# Patient Record
Sex: FEMALE | NOT HISPANIC OR LATINO
[De-identification: names, ages, dates, MRNs, and addresses within clinical notes are randomized per-mention and may not be internally consistent; named-entity substitution may affect disease eponyms.]

---

## 2018-01-04 PROBLEM — Z00.00 ENCOUNTER FOR PREVENTIVE HEALTH EXAMINATION: Status: ACTIVE | Noted: 2018-01-04

## 2018-01-08 ENCOUNTER — RECORD ABSTRACTING (OUTPATIENT)
Age: 43
End: 2018-01-08

## 2018-01-08 DIAGNOSIS — Z78.9 OTHER SPECIFIED HEALTH STATUS: ICD-10-CM

## 2018-01-08 DIAGNOSIS — Z83.3 FAMILY HISTORY OF DIABETES MELLITUS: ICD-10-CM

## 2018-01-08 DIAGNOSIS — Z82.5 FAMILY HISTORY OF ASTHMA AND OTHER CHRONIC LOWER RESPIRATORY DISEASES: ICD-10-CM

## 2018-01-08 DIAGNOSIS — Z82.49 FAMILY HISTORY OF ISCHEMIC HEART DISEASE AND OTHER DISEASES OF THE CIRCULATORY SYSTEM: ICD-10-CM

## 2018-01-08 DIAGNOSIS — D68.59 OTHER PRIMARY THROMBOPHILIA: ICD-10-CM

## 2018-01-08 RX ORDER — ENOXAPARIN SODIUM 40 MG/.4ML
40 INJECTION SUBCUTANEOUS
Refills: 0 | Status: ACTIVE | COMMUNITY

## 2018-01-08 RX ORDER — DIAZEPAM 5 MG/1
5 TABLET ORAL
Refills: 0 | Status: ACTIVE | COMMUNITY

## 2018-01-08 RX ORDER — METHYLPREDNISOLONE 4 MG/1
4 TABLET ORAL
Refills: 0 | Status: ACTIVE | COMMUNITY

## 2018-01-08 RX ORDER — CHORIONIC GONADOTROPIN 10000 UNIT
KIT INTRAMUSCULAR
Refills: 0 | Status: ACTIVE | COMMUNITY

## 2018-01-08 RX ORDER — NITROFURANTOIN (MONOHYDRATE/MACROCRYSTALS) 25; 75 MG/1; MG/1
100 CAPSULE ORAL
Refills: 0 | Status: ACTIVE | COMMUNITY

## 2018-01-08 RX ORDER — METFORMIN ER 750 MG 750 MG/1
750 TABLET ORAL
Refills: 0 | Status: ACTIVE | COMMUNITY

## 2018-01-08 RX ORDER — PROGESTERONE 50 MG/ML
50 INJECTION, SOLUTION INTRAMUSCULAR
Refills: 0 | Status: ACTIVE | COMMUNITY

## 2018-01-23 ENCOUNTER — APPOINTMENT (OUTPATIENT)
Dept: OBGYN | Facility: CLINIC | Age: 43
End: 2018-01-23
Payer: COMMERCIAL

## 2018-01-23 VITALS — HEIGHT: 63 IN | BODY MASS INDEX: 51.91 KG/M2 | WEIGHT: 293 LBS

## 2018-01-23 LAB
BILIRUB UR QL STRIP: NORMAL
CLARITY UR: CLEAR
GLUCOSE UR-MCNC: NORMAL
HCG UR QL: NORMAL EU/DL
HGB UR QL STRIP.AUTO: NORMAL
KETONES UR-MCNC: NORMAL
LEUKOCYTE ESTERASE UR QL STRIP: NORMAL
NITRITE UR QL STRIP: NORMAL
PH UR STRIP: NORMAL
PROT UR STRIP-MCNC: NORMAL
SP GR UR STRIP: NORMAL

## 2018-01-23 PROCEDURE — 0502F SUBSEQUENT PRENATAL CARE: CPT

## 2018-01-29 ENCOUNTER — RESULT REVIEW (OUTPATIENT)
Age: 43
End: 2018-01-29

## 2018-01-30 ENCOUNTER — APPOINTMENT (OUTPATIENT)
Dept: OBGYN | Facility: CLINIC | Age: 43
End: 2018-01-30
Payer: COMMERCIAL

## 2018-01-30 ENCOUNTER — OUTPATIENT (OUTPATIENT)
Dept: OUTPATIENT SERVICES | Facility: HOSPITAL | Age: 43
LOS: 1 days | Discharge: HOME | End: 2018-01-30

## 2018-01-30 VITALS — BODY MASS INDEX: 51.91 KG/M2 | WEIGHT: 293 LBS | HEIGHT: 63 IN

## 2018-01-30 DIAGNOSIS — Z34.90 ENCOUNTER FOR SUPERVISION OF NORMAL PREGNANCY, UNSPECIFIED, UNSPECIFIED TRIMESTER: ICD-10-CM

## 2018-01-30 PROCEDURE — 0502F SUBSEQUENT PRENATAL CARE: CPT

## 2018-02-01 ENCOUNTER — APPOINTMENT (OUTPATIENT)
Dept: OBGYN | Facility: CLINIC | Age: 43
End: 2018-02-01

## 2018-02-04 DIAGNOSIS — O41.00X0 OLIGOHYDRAMNIOS, UNSPECIFIED TRIMESTER, NOT APPLICABLE OR UNSPECIFIED: ICD-10-CM

## 2018-02-08 ENCOUNTER — APPOINTMENT (OUTPATIENT)
Dept: OBGYN | Facility: CLINIC | Age: 43
End: 2018-02-08
Payer: COMMERCIAL

## 2018-02-08 ENCOUNTER — OUTPATIENT (OUTPATIENT)
Dept: OUTPATIENT SERVICES | Facility: HOSPITAL | Age: 43
LOS: 1 days | Discharge: HOME | End: 2018-02-08

## 2018-02-08 VITALS — WEIGHT: 293 LBS | BODY MASS INDEX: 54.56 KG/M2

## 2018-02-08 DIAGNOSIS — Z34.90 ENCOUNTER FOR SUPERVISION OF NORMAL PREGNANCY, UNSPECIFIED, UNSPECIFIED TRIMESTER: ICD-10-CM

## 2018-02-08 DIAGNOSIS — Z98.890 OTHER SPECIFIED POSTPROCEDURAL STATES: ICD-10-CM

## 2018-02-08 DIAGNOSIS — Z34.00 ENCOUNTER FOR SUPERVISION OF NORMAL FIRST PREGNANCY, UNSPECIFIED TRIMESTER: ICD-10-CM

## 2018-02-08 LAB
GLUCOSE UR-MCNC: NORMAL
HGB UR QL STRIP.AUTO: NORMAL
KETONES UR-MCNC: NORMAL
LEUKOCYTE ESTERASE UR QL STRIP: NORMAL
NITRITE UR QL STRIP: NORMAL
PROT UR STRIP-MCNC: NORMAL

## 2018-02-08 PROCEDURE — 0502F SUBSEQUENT PRENATAL CARE: CPT

## 2018-02-14 ENCOUNTER — INPATIENT (INPATIENT)
Facility: HOSPITAL | Age: 43
LOS: 5 days | Discharge: HOME | End: 2018-02-20
Attending: OBSTETRICS & GYNECOLOGY | Admitting: OBSTETRICS & GYNECOLOGY

## 2018-02-14 ENCOUNTER — APPOINTMENT (OUTPATIENT)
Dept: OBGYN | Facility: CLINIC | Age: 43
End: 2018-02-14
Payer: COMMERCIAL

## 2018-02-14 ENCOUNTER — NON-APPOINTMENT (OUTPATIENT)
Age: 43
End: 2018-02-14

## 2018-02-14 VITALS — WEIGHT: 293 LBS | BODY MASS INDEX: 54.56 KG/M2

## 2018-02-14 VITALS — TEMPERATURE: 98 F

## 2018-02-14 DIAGNOSIS — Z3A.37 37 WEEKS GESTATION OF PREGNANCY: ICD-10-CM

## 2018-02-14 DIAGNOSIS — O24.419 GESTATIONAL DIABETES MELLITUS IN PREGNANCY, UNSPECIFIED CONTROL: ICD-10-CM

## 2018-02-14 DIAGNOSIS — Z98.890 OTHER SPECIFIED POSTPROCEDURAL STATES: Chronic | ICD-10-CM

## 2018-02-14 DIAGNOSIS — O14.93 UNSPECIFIED PRE-ECLAMPSIA, THIRD TRIMESTER: ICD-10-CM

## 2018-02-14 DIAGNOSIS — O30.009 TWIN PREGNANCY, UNSPECIFIED NUMBER OF PLACENTA AND UNSPECIFIED NUMBER OF AMNIOTIC SACS, UNSPECIFIED TRIMESTER: ICD-10-CM

## 2018-02-14 LAB
ALBUMIN SERPL ELPH-MCNC: 1.8 G/DL — LOW (ref 3–5.5)
ALP SERPL-CCNC: 277 U/L — HIGH (ref 30–115)
ALT FLD-CCNC: 57 U/L — HIGH (ref 0–41)
ANION GAP SERPL CALC-SCNC: 12 MMOL/L — SIGNIFICANT CHANGE UP (ref 7–14)
APPEARANCE UR: (no result)
APTT BLD: 32.2 SEC — SIGNIFICANT CHANGE UP (ref 27–39.2)
AST SERPL-CCNC: 74 U/L — HIGH (ref 0–41)
BACTERIA # UR AUTO: (no result) /HPF
BASOPHILS # BLD AUTO: 0.05 K/UL — SIGNIFICANT CHANGE UP (ref 0–0.2)
BASOPHILS NFR BLD AUTO: 0.5 % — SIGNIFICANT CHANGE UP (ref 0–1)
BILIRUB SERPL-MCNC: 0.7 MG/DL — SIGNIFICANT CHANGE UP (ref 0.2–1.2)
BILIRUB UR-MCNC: (no result)
BLD GP AB SCN SERPL QL: SIGNIFICANT CHANGE UP
BUN SERPL-MCNC: 13 MG/DL — SIGNIFICANT CHANGE UP (ref 10–20)
CALCIUM SERPL-MCNC: 7.9 MG/DL — LOW (ref 8.5–10.1)
CHLORIDE SERPL-SCNC: 106 MMOL/L — SIGNIFICANT CHANGE UP (ref 98–110)
CO2 SERPL-SCNC: 16 MMOL/L — LOW (ref 17–32)
COLOR SPEC: (no result)
CREAT SERPL-MCNC: 0.8 MG/DL — SIGNIFICANT CHANGE UP (ref 0.7–1.5)
DIFF PNL FLD: (no result)
EOSINOPHIL # BLD AUTO: 0.09 K/UL — SIGNIFICANT CHANGE UP (ref 0–0.7)
EOSINOPHIL NFR BLD AUTO: 0.9 % — SIGNIFICANT CHANGE UP (ref 0–8)
EPI CELLS # UR: (no result) /HPF
FIBRINOGEN PPP-MCNC: >700 MG/DL — HIGH (ref 204.4–570.6)
GLUCOSE SERPL-MCNC: 65 MG/DL — LOW (ref 70–110)
GLUCOSE UR QL: NEGATIVE — SIGNIFICANT CHANGE UP
GLUCOSE UR-MCNC: NORMAL
HCT VFR BLD CALC: 32.7 % — LOW (ref 37–47)
HGB BLD-MCNC: 11.1 G/DL — LOW (ref 14–18)
HGB UR QL STRIP.AUTO: NORMAL
HIV 1 & 2 AB SERPL IA.RAPID: SIGNIFICANT CHANGE UP
IMM GRANULOCYTES NFR BLD AUTO: 0.8 % — HIGH (ref 0.1–0.3)
INR BLD: 0.92 RATIO — SIGNIFICANT CHANGE UP (ref 0.65–1.3)
KETONES UR-MCNC: NEGATIVE — SIGNIFICANT CHANGE UP
KETONES UR-MCNC: NORMAL
LDH SERPL L TO P-CCNC: 311 U/L — HIGH (ref 60–200)
LEUKOCYTE ESTERASE UR QL STRIP: NORMAL
LEUKOCYTE ESTERASE UR-ACNC: NEGATIVE — SIGNIFICANT CHANGE UP
LYMPHOCYTES # BLD AUTO: 1.32 K/UL — SIGNIFICANT CHANGE UP (ref 1.2–3.4)
LYMPHOCYTES # BLD AUTO: 13 % — LOW (ref 20.5–51.1)
MCHC RBC-ENTMCNC: 29.2 PG — SIGNIFICANT CHANGE UP (ref 27–31)
MCHC RBC-ENTMCNC: 33.9 G/DL — SIGNIFICANT CHANGE UP (ref 32–37)
MCV RBC AUTO: 86.1 FL — SIGNIFICANT CHANGE UP (ref 81–91)
MONOCYTES # BLD AUTO: 0.72 K/UL — HIGH (ref 0.1–0.6)
MONOCYTES NFR BLD AUTO: 7.1 % — SIGNIFICANT CHANGE UP (ref 1.7–9.3)
NEUTROPHILS # BLD AUTO: 7.88 K/UL — HIGH (ref 1.4–6.5)
NEUTROPHILS NFR BLD AUTO: 77.7 % — HIGH (ref 42.2–75.2)
NITRITE UR QL STRIP: NORMAL
NITRITE UR-MCNC: NEGATIVE — SIGNIFICANT CHANGE UP
NRBC # BLD: 0 /100 WBCS — SIGNIFICANT CHANGE UP (ref 0–0)
PH UR: 6 — SIGNIFICANT CHANGE UP (ref 5–8)
PLATELET # BLD AUTO: 273 K/UL — SIGNIFICANT CHANGE UP (ref 130–400)
POTASSIUM SERPL-MCNC: 5 MMOL/L — SIGNIFICANT CHANGE UP (ref 3.5–5)
POTASSIUM SERPL-SCNC: 5 MMOL/L — SIGNIFICANT CHANGE UP (ref 3.5–5)
PROT SERPL-MCNC: 4.7 G/DL — LOW (ref 6–8)
PROT UR STRIP-MCNC: NORMAL
PROT UR-MCNC: >=300
PROTHROM AB SERPL-ACNC: 9.9 SEC — LOW (ref 9.95–12.87)
RBC # BLD: 3.8 M/UL — LOW (ref 4.2–5.4)
RBC # FLD: 18.7 % — HIGH (ref 11.5–14.5)
RBC CASTS # UR COMP ASSIST: (no result) /HPF
SODIUM SERPL-SCNC: 134 MMOL/L — LOW (ref 135–146)
SP GR SPEC: >=1.03 — SIGNIFICANT CHANGE UP (ref 1.01–1.03)
TYPE + AB SCN PNL BLD: SIGNIFICANT CHANGE UP
URATE SERPL-MCNC: 7.2 MG/DL — SIGNIFICANT CHANGE UP (ref 2.6–8)
UROBILINOGEN FLD QL: 0.2 — SIGNIFICANT CHANGE UP (ref 0.2–0.2)
WBC # BLD: 10.14 K/UL — SIGNIFICANT CHANGE UP (ref 4.8–10.8)
WBC # FLD AUTO: 10.14 K/UL — SIGNIFICANT CHANGE UP (ref 4.8–10.8)
WBC UR QL: SIGNIFICANT CHANGE UP /HPF

## 2018-02-14 PROCEDURE — 0502F SUBSEQUENT PRENATAL CARE: CPT

## 2018-02-14 RX ORDER — SODIUM CHLORIDE 9 MG/ML
1000 INJECTION, SOLUTION INTRAVENOUS
Qty: 0 | Refills: 0 | Status: DISCONTINUED | OUTPATIENT
Start: 2018-02-14 | End: 2018-02-15

## 2018-02-14 RX ORDER — TETANUS TOXOID, REDUCED DIPHTHERIA TOXOID AND ACELLULAR PERTUSSIS VACCINE, ADSORBED 5; 2.5; 8; 8; 2.5 [IU]/.5ML; [IU]/.5ML; UG/.5ML; UG/.5ML; UG/.5ML
0.5 SUSPENSION INTRAMUSCULAR ONCE
Qty: 0 | Refills: 0 | Status: DISCONTINUED | OUTPATIENT
Start: 2018-02-14 | End: 2018-02-15

## 2018-02-14 RX ORDER — OXYCODONE AND ACETAMINOPHEN 5; 325 MG/1; MG/1
2 TABLET ORAL EVERY 6 HOURS
Qty: 0 | Refills: 0 | Status: DISCONTINUED | OUTPATIENT
Start: 2018-02-14 | End: 2018-02-20

## 2018-02-14 RX ORDER — LANOLIN
1 OINTMENT (GRAM) TOPICAL
Qty: 0 | Refills: 0 | Status: DISCONTINUED | OUTPATIENT
Start: 2018-02-14 | End: 2018-02-20

## 2018-02-14 RX ORDER — GLYCERIN ADULT
1 SUPPOSITORY, RECTAL RECTAL AT BEDTIME
Qty: 0 | Refills: 0 | Status: DISCONTINUED | OUTPATIENT
Start: 2018-02-14 | End: 2018-02-20

## 2018-02-14 RX ORDER — OXYTOCIN 10 UNIT/ML
41.67 VIAL (ML) INJECTION
Qty: 20 | Refills: 0 | Status: DISCONTINUED | OUTPATIENT
Start: 2018-02-14 | End: 2018-02-20

## 2018-02-14 RX ORDER — SODIUM CHLORIDE 9 MG/ML
300 INJECTION, SOLUTION INTRAVENOUS ONCE
Qty: 0 | Refills: 0 | Status: DISCONTINUED | OUTPATIENT
Start: 2018-02-14 | End: 2018-02-15

## 2018-02-14 RX ORDER — MAGNESIUM SULFATE 500 MG/ML
4 VIAL (ML) INJECTION ONCE
Qty: 0 | Refills: 0 | Status: COMPLETED | OUTPATIENT
Start: 2018-02-14 | End: 2018-02-14

## 2018-02-14 RX ORDER — CEFAZOLIN SODIUM 1 G
2000 VIAL (EA) INJECTION EVERY 8 HOURS
Qty: 0 | Refills: 0 | Status: COMPLETED | OUTPATIENT
Start: 2018-02-14 | End: 2018-02-16

## 2018-02-14 RX ORDER — LABETALOL HCL 100 MG
40 TABLET ORAL ONCE
Qty: 0 | Refills: 0 | Status: COMPLETED | OUTPATIENT
Start: 2018-02-14 | End: 2018-02-14

## 2018-02-14 RX ORDER — OXYTOCIN 10 UNIT/ML
333.33 VIAL (ML) INJECTION
Qty: 20 | Refills: 0 | Status: DISCONTINUED | OUTPATIENT
Start: 2018-02-14 | End: 2018-02-16

## 2018-02-14 RX ORDER — SIMETHICONE 80 MG/1
80 TABLET, CHEWABLE ORAL EVERY 4 HOURS
Qty: 0 | Refills: 0 | Status: DISCONTINUED | OUTPATIENT
Start: 2018-02-14 | End: 2018-02-20

## 2018-02-14 RX ORDER — LABETALOL HCL 100 MG
80 TABLET ORAL ONCE
Qty: 0 | Refills: 0 | Status: DISCONTINUED | OUTPATIENT
Start: 2018-02-14 | End: 2018-02-14

## 2018-02-14 RX ORDER — MORPHINE SULFATE 50 MG/1
3 CAPSULE, EXTENDED RELEASE ORAL ONCE
Qty: 0 | Refills: 0 | Status: DISCONTINUED | OUTPATIENT
Start: 2018-02-14 | End: 2018-02-20

## 2018-02-14 RX ORDER — ACETAMINOPHEN 500 MG
650 TABLET ORAL EVERY 6 HOURS
Qty: 0 | Refills: 0 | Status: DISCONTINUED | OUTPATIENT
Start: 2018-02-14 | End: 2018-02-20

## 2018-02-14 RX ORDER — OXYCODONE AND ACETAMINOPHEN 5; 325 MG/1; MG/1
1 TABLET ORAL
Qty: 0 | Refills: 0 | Status: DISCONTINUED | OUTPATIENT
Start: 2018-02-14 | End: 2018-02-20

## 2018-02-14 RX ORDER — FERROUS SULFATE 325(65) MG
325 TABLET ORAL DAILY
Qty: 0 | Refills: 0 | Status: DISCONTINUED | OUTPATIENT
Start: 2018-02-14 | End: 2018-02-15

## 2018-02-14 RX ORDER — DOCUSATE SODIUM 100 MG
100 CAPSULE ORAL
Qty: 0 | Refills: 0 | Status: DISCONTINUED | OUTPATIENT
Start: 2018-02-14 | End: 2018-02-20

## 2018-02-14 RX ORDER — ENOXAPARIN SODIUM 100 MG/ML
40 INJECTION SUBCUTANEOUS DAILY
Qty: 0 | Refills: 0 | Status: DISCONTINUED | OUTPATIENT
Start: 2018-02-14 | End: 2018-02-20

## 2018-02-14 RX ORDER — MAGNESIUM SULFATE 500 MG/ML
2 VIAL (ML) INJECTION
Qty: 40 | Refills: 0 | Status: COMPLETED | OUTPATIENT
Start: 2018-02-14 | End: 2018-02-15

## 2018-02-14 RX ORDER — CITRIC ACID/SODIUM CITRATE 300-500 MG
15 SOLUTION, ORAL ORAL EVERY 4 HOURS
Qty: 0 | Refills: 0 | Status: DISCONTINUED | OUTPATIENT
Start: 2018-02-14 | End: 2018-02-15

## 2018-02-14 RX ORDER — HYDRALAZINE HCL 50 MG
10 TABLET ORAL ONCE
Qty: 0 | Refills: 0 | Status: DISCONTINUED | OUTPATIENT
Start: 2018-02-14 | End: 2018-02-14

## 2018-02-14 RX ORDER — IBUPROFEN 200 MG
600 TABLET ORAL EVERY 6 HOURS
Qty: 0 | Refills: 0 | Status: DISCONTINUED | OUTPATIENT
Start: 2018-02-14 | End: 2018-02-20

## 2018-02-14 RX ORDER — CEFAZOLIN SODIUM 1 G
2000 VIAL (EA) INJECTION ONCE
Qty: 0 | Refills: 0 | Status: COMPLETED | OUTPATIENT
Start: 2018-02-14 | End: 2018-02-14

## 2018-02-14 RX ORDER — LABETALOL HCL 100 MG
20 TABLET ORAL ONCE
Qty: 0 | Refills: 0 | Status: COMPLETED | OUTPATIENT
Start: 2018-02-14 | End: 2018-02-14

## 2018-02-14 RX ORDER — ONDANSETRON 8 MG/1
4 TABLET, FILM COATED ORAL EVERY 6 HOURS
Qty: 0 | Refills: 0 | Status: DISCONTINUED | OUTPATIENT
Start: 2018-02-14 | End: 2018-02-20

## 2018-02-14 RX ORDER — SODIUM CHLORIDE 9 MG/ML
1000 INJECTION, SOLUTION INTRAVENOUS
Qty: 0 | Refills: 0 | Status: DISCONTINUED | OUTPATIENT
Start: 2018-02-14 | End: 2018-02-14

## 2018-02-14 RX ORDER — LABETALOL HCL 100 MG
80 TABLET ORAL ONCE
Qty: 0 | Refills: 0 | Status: COMPLETED | OUTPATIENT
Start: 2018-02-14 | End: 2018-02-14

## 2018-02-14 RX ORDER — DIPHENHYDRAMINE HCL 50 MG
25 CAPSULE ORAL EVERY 6 HOURS
Qty: 0 | Refills: 0 | Status: DISCONTINUED | OUTPATIENT
Start: 2018-02-14 | End: 2018-02-20

## 2018-02-14 RX ORDER — KETOROLAC TROMETHAMINE 30 MG/ML
30 SYRINGE (ML) INJECTION ONCE
Qty: 0 | Refills: 0 | Status: DISCONTINUED | OUTPATIENT
Start: 2018-02-14 | End: 2018-02-20

## 2018-02-14 RX ADMIN — Medication 50 GM/HR: at 17:37

## 2018-02-14 RX ADMIN — Medication 20 MILLIGRAM(S): at 16:11

## 2018-02-14 RX ADMIN — Medication 100 MILLIGRAM(S): at 19:20

## 2018-02-14 RX ADMIN — Medication 40 MILLIGRAM(S): at 16:34

## 2018-02-14 RX ADMIN — Medication 300 GRAM(S): at 17:00

## 2018-02-14 RX ADMIN — Medication 80 MILLIGRAM(S): at 17:15

## 2018-02-14 NOTE — OB PROVIDER TRIAGE NOTE - NSOBPROVIDERNOTE_OBGYN_ALL_OB_FT
40yo  at 37w4d GA, GBS neg, IVF pregnancy, on heparin, GDMA1, well controlled, r/o gHTN vs preeclampsia, h/o previous  for repeat    - transfer to recovery   - admission labs  - preeclamptic labs   - continuous EFM/toco  - NPO   - FS q4h  - Upr/cr ratio

## 2018-02-14 NOTE — PROGRESS NOTE ADULT - SUBJECTIVE AND OBJECTIVE BOX
PGY2 Note    Vital Signs Last 24 Hrs  T(C): 37.1 (14 Feb 2018 16:47), Max: 37.1 (14 Feb 2018 16:47)  T(F): 98.7 (14 Feb 2018 16:47), Max: 98.7 (14 Feb 2018 16:47)  HR: 78 (14 Feb 2018 17:09) (78 - 83)  BP: 184/81 (14 Feb 2018 17:09) (142/61 - 194/90)  RR: 18 (14 Feb 2018 16:47) (18 - 18)    Labetalol 20mg IV x1 @1611, Labetalol 40mg IV x1 @1634, Magnesium started at 1700,     Repeat Labetalol 80mg IV x1 at 1717    will cont to monitor repeat BP in 10 mins, cont current management    Dr. Siddiqi and Dr. Adams aware

## 2018-02-14 NOTE — PRE-ANESTHESIA EVALUATION ADULT - NSANTHOSAYNRD_GEN_A_CORE
No. MUSTAPHA screening performed.  STOP BANG Legend: 0-2 = LOW Risk; 3-4 = INTERMEDIATE Risk; 5-8 = HIGH Risk

## 2018-02-14 NOTE — OB PROVIDER TRIAGE NOTE - NSHPPHYSICALEXAM_GEN_ALL_CORE
Vital Signs Last 24 Hrs  T(C): 36.9 (14 Feb 2018 13:05), Max: 36.9 (14 Feb 2018 13:00)  T(F): 98.4 (14 Feb 2018 13:05), Max: 98.4 (14 Feb 2018 13:00)  HR: 79 (14 Feb 2018 13:28) (79 - 79)  BP: 142/61 (14 Feb 2018 13:28) (142/61 - 142/61)  RR: 18 (14 Feb 2018 13:05) (18 - 18) Vital Signs Last 24 Hrs  T(C): 36.9 (14 Feb 2018 13:05), Max: 36.9 (14 Feb 2018 13:00)  T(F): 98.4 (14 Feb 2018 13:05), Max: 98.4 (14 Feb 2018 13:00)  HR: 79 (14 Feb 2018 13:28) (79 - 79)  BP: 142/61 (14 Feb 2018 13:28) (142/61 - 142/61)  RR: 18 (14 Feb 2018 13:05) (18 - 18)   Udip: 100 protein, small blood   Abd: soft, gravid, nontender, no palpable ctx, no RUQ/epigastric tenderness   DTRs 2+ UE   LE: B/L pitting edema

## 2018-02-14 NOTE — PROGRESS NOTE ADULT - SUBJECTIVE AND OBJECTIVE BOX
PGY2 Note    Patient seen at bedside for further elevated BPs, 20 then 40mg labetalol pushed, repeat BP after 40mg was 177/74, will start magnesium    T(F): 98.4 ( @ 13:05), Max: 98.4 ( @ 13:00)  HR: 81 ( @ 16:44)  BP: 177/74 ( @ 16:44) (142/61 - 194/90)  RR: 18 ( @ 13:05)  EFM: TwinA 140/mod silvia/ pos accel, Twin B 140/mod silvia/pos accel  TOCO: none  SVE: deferred    Medications:  labetalol Injectable: 40 ( @ 16:34)  labetalol Injectable: 20 ( @ 16:11)    Labs:                        11.1   10.14 )-----------( 273      ( 2018 15:53 )             32.7         134<L>  |  106  |  13  ----------------------------<  65<L>  5.0   |  16<L>  |  0.8    Ca    7.9<L>      2018 15:53    TPro  4.7<L>  /  Alb  1.8<L>  /  TBili  x   /  DBili  x   /  AST  x   /  ALT  x   /  AlkPhos  x             A/P:   43yo  at 37w4d GA, twin gestation, by IVF, day 3 frozen embryo transfer, h/o prior c/section, now with preeclampsia with severe range pressures, starting magnesium   -start magnesium 4gm bolus then 2gm/hr cont infusion  -pain management prn  -cont efm/toco  -f/u pending labs  -cont to monitor vitals  -seizure precautions  -cont I/Os  -cont O2 Sats  -guillermo  -on call to OR

## 2018-02-14 NOTE — OB PROVIDER TRIAGE NOTE - HISTORY OF PRESENT ILLNESS
42yo  at 37w4d GA, by IVF, day 3 frozen embryo transfer, d 44yo  at 37w4d GA, twin gestation, by IVF, day 3 frozen embryo transfer, sent in from office for elevated /90 with protein on Udip. Denies h/o elevated BPs previously during the pregnancy. Denies ctx, VB/LOF. Good fetal movements. Denies headache, blurred vision, RUQ/epigastric pain. C/o B/L pitting edema for the past 3 weeks. GDMA1 during this pregnancy, FFS range: 68-80, PPFS: 100-105, well controlled. Was on lovenox during this pregnancy in view of IVF gestation, switched to heparin at 36 w GA, last dose last night at 0000. In same sex relationship. Official sonogram done earlier today, efw for both babies 5-9 to 6lb. Last exam 1 week ago- closed.    OBHx: C/S X1, NRFHR 5-9; SABX1 at 6 w GA, no D&C done     GynHx: h/o endometriosis, S/P lap cystectomy; h/o abnormal pap smear, required no further follow up as per patient. Denies h/o fibroids, STIs.

## 2018-02-14 NOTE — OB PROVIDER H&P - ASSESSMENT
42yo  at 37w4d GA, twin gestation, by IVF, day 3 frozen embryo transfer presents for admission for repeat  section for preeclampsia with severe range BPs

## 2018-02-14 NOTE — BRIEF OPERATIVE NOTE - PRE-OP DX
Twin gestation, unable to determine number of placenta and number of amniotic sacs in third trimester  02/14/2018    Active  [place]

## 2018-02-14 NOTE — OB PROVIDER TRIAGE NOTE - FAMILY HISTORY
Mother  Still living? Unknown  Family history of hypertension, Age at diagnosis: Age Unknown  Family history of diabetes mellitus, Age at diagnosis: Age Unknown

## 2018-02-14 NOTE — OB PROVIDER H&P - PROBLEM SELECTOR PLAN 1
admit, npo, labetalol IV push to control BPs, can consider mag if cont to be elevated, preeclamptic labs

## 2018-02-14 NOTE — OB PROVIDER H&P - HISTORY OF PRESENT ILLNESS
44yo  at 37w4d GA, twin gestation, by IVF, day 3 frozen embryo transfer, sent in from office for elevated /90 with protein on Udip. Denies h/o elevated BPs previously during the pregnancy. Denies ctx, VB/LOF. Good fetal movements. Denies headache, blurred vision, RUQ/epigastric pain. C/o B/L pitting edema for the past 3 weeks. GDMA1 during this pregnancy, FFS range: 68-80, PPFS: 100-105, well controlled. Was on lovenox during this pregnancy in view of IVF gestation, switched to heparin at 36 w GA, last dose last night at 0000. In same sex relationship. Official sonogram done earlier today, efw for both babies 5-9 to 6lb. Last exam 1 week ago- closed.    OBHx: C/S X1, NRFHR 5-9; SABX1 at 6 w GA, no D&C done     GynHx: h/o endometriosis, S/P lap cystectomy; h/o abnormal pap smear, required no further follow up as per patient. Denies h/o fibroids, STIs.

## 2018-02-14 NOTE — OB PROVIDER H&P - NSHPPHYSICALEXAM_GEN_ALL_CORE
Vital Signs Last 24 Hrs  T(C): 36.9 (14 Feb 2018 13:05), Max: 36.9 (14 Feb 2018 13:00)  T(F): 98.4 (14 Feb 2018 13:05), Max: 98.4 (14 Feb 2018 13:00)  HR: 79 (14 Feb 2018 13:28) (79 - 79)  BP: 172/88 (14 Feb 2018 15:46) (142/61 - 172/88)  RR: 18 (14 Feb 2018 13:05) (18 - 18)  RR: 18 (14 Feb 2018 13:05) (18 - 18)   Udip: 100 protein, small blood   Abd: soft, gravid, nontender, no palpable ctx, no RUQ/epigastric tenderness   DTRs 2+ UE   LE: B/L pitting edema

## 2018-02-15 LAB
ALBUMIN SERPL ELPH-MCNC: 1.6 G/DL — LOW (ref 3–5.5)
ALP SERPL-CCNC: 235 U/L — HIGH (ref 30–115)
ALT FLD-CCNC: 53 U/L — HIGH (ref 0–41)
AMPHET UR-MCNC: NEGATIVE — SIGNIFICANT CHANGE UP
ANION GAP SERPL CALC-SCNC: 10 MMOL/L — SIGNIFICANT CHANGE UP (ref 7–14)
AST SERPL-CCNC: 69 U/L — HIGH (ref 0–41)
BARBITURATES UR SCN-MCNC: NEGATIVE — SIGNIFICANT CHANGE UP
BENZODIAZ UR-MCNC: NEGATIVE — SIGNIFICANT CHANGE UP
BILIRUB SERPL-MCNC: 0.4 MG/DL — SIGNIFICANT CHANGE UP (ref 0.2–1.2)
BUN SERPL-MCNC: 14 MG/DL — SIGNIFICANT CHANGE UP (ref 10–20)
CALCIUM SERPL-MCNC: 7.5 MG/DL — LOW (ref 8.5–10.1)
CHLORIDE SERPL-SCNC: 106 MMOL/L — SIGNIFICANT CHANGE UP (ref 98–110)
CO2 SERPL-SCNC: 17 MMOL/L — SIGNIFICANT CHANGE UP (ref 17–32)
COCAINE METAB.OTHER UR-MCNC: NEGATIVE — SIGNIFICANT CHANGE UP
CREAT SERPL-MCNC: 0.9 MG/DL — SIGNIFICANT CHANGE UP (ref 0.7–1.5)
GLUCOSE SERPL-MCNC: 91 MG/DL — SIGNIFICANT CHANGE UP (ref 70–110)
HCT VFR BLD CALC: 32.5 % — LOW (ref 37–47)
HGB BLD-MCNC: 10.4 G/DL — LOW (ref 14–18)
MAGNESIUM SERPL-MCNC: 5.5 MG/DL — CRITICAL HIGH (ref 1.8–2.4)
MAGNESIUM SERPL-MCNC: 6 MG/DL — CRITICAL HIGH (ref 1.8–2.4)
MCHC RBC-ENTMCNC: 28.6 PG — SIGNIFICANT CHANGE UP (ref 27–31)
MCHC RBC-ENTMCNC: 32 G/DL — SIGNIFICANT CHANGE UP (ref 32–37)
MCV RBC AUTO: 89.3 FL — SIGNIFICANT CHANGE UP (ref 81–91)
METHADONE UR-MCNC: NEGATIVE — SIGNIFICANT CHANGE UP
NRBC # BLD: 0 /100 WBCS — SIGNIFICANT CHANGE UP (ref 0–0)
OPIATES UR-MCNC: NEGATIVE — SIGNIFICANT CHANGE UP
PCP SPEC-MCNC: SIGNIFICANT CHANGE UP
PLATELET # BLD AUTO: 149 K/UL — SIGNIFICANT CHANGE UP (ref 130–400)
POTASSIUM SERPL-MCNC: 4.8 MMOL/L — SIGNIFICANT CHANGE UP (ref 3.5–5)
POTASSIUM SERPL-SCNC: 4.8 MMOL/L — SIGNIFICANT CHANGE UP (ref 3.5–5)
PROPOXYPHENE QUALITATIVE URINE RESULT: NEGATIVE — SIGNIFICANT CHANGE UP
PROT SERPL-MCNC: 4.2 G/DL — LOW (ref 6–8)
RBC # BLD: 3.64 M/UL — LOW (ref 4.2–5.4)
RBC # FLD: 19.2 % — HIGH (ref 11.5–14.5)
SODIUM SERPL-SCNC: 133 MMOL/L — LOW (ref 135–146)
WBC # BLD: 11.18 K/UL — HIGH (ref 4.8–10.8)
WBC # FLD AUTO: 11.18 K/UL — HIGH (ref 4.8–10.8)

## 2018-02-15 RX ORDER — HYDRALAZINE HCL 50 MG
10 TABLET ORAL ONCE
Qty: 0 | Refills: 0 | Status: COMPLETED | OUTPATIENT
Start: 2018-02-15 | End: 2018-02-15

## 2018-02-15 RX ORDER — LABETALOL HCL 100 MG
20 TABLET ORAL ONCE
Qty: 0 | Refills: 0 | Status: COMPLETED | OUTPATIENT
Start: 2018-02-15 | End: 2018-02-15

## 2018-02-15 RX ORDER — LABETALOL HCL 100 MG
200 TABLET ORAL DAILY
Qty: 0 | Refills: 0 | Status: DISCONTINUED | OUTPATIENT
Start: 2018-02-15 | End: 2018-02-16

## 2018-02-15 RX ORDER — NIFEDIPINE 30 MG
30 TABLET, EXTENDED RELEASE 24 HR ORAL DAILY
Qty: 0 | Refills: 0 | Status: DISCONTINUED | OUTPATIENT
Start: 2018-02-16 | End: 2018-02-16

## 2018-02-15 RX ORDER — SODIUM CHLORIDE 0.65 %
1 AEROSOL, SPRAY (ML) NASAL THREE TIMES A DAY
Qty: 0 | Refills: 0 | Status: DISCONTINUED | OUTPATIENT
Start: 2018-02-15 | End: 2018-02-20

## 2018-02-15 RX ORDER — LABETALOL HCL 100 MG
100 TABLET ORAL ONCE
Qty: 0 | Refills: 0 | Status: COMPLETED | OUTPATIENT
Start: 2018-02-15 | End: 2018-02-15

## 2018-02-15 RX ORDER — SODIUM CHLORIDE 9 MG/ML
1000 INJECTION, SOLUTION INTRAVENOUS
Qty: 0 | Refills: 0 | Status: DISCONTINUED | OUTPATIENT
Start: 2018-02-15 | End: 2018-02-16

## 2018-02-15 RX ORDER — LABETALOL HCL 100 MG
300 TABLET ORAL THREE TIMES A DAY
Qty: 0 | Refills: 0 | Status: DISCONTINUED | OUTPATIENT
Start: 2018-02-15 | End: 2018-02-15

## 2018-02-15 RX ORDER — HYDRALAZINE HCL 50 MG
5 TABLET ORAL ONCE
Qty: 0 | Refills: 0 | Status: COMPLETED | OUTPATIENT
Start: 2018-02-15 | End: 2018-02-15

## 2018-02-15 RX ADMIN — Medication 100 MILLIGRAM(S): at 22:23

## 2018-02-15 RX ADMIN — SODIUM CHLORIDE 125 MILLILITER(S): 9 INJECTION, SOLUTION INTRAVENOUS at 12:39

## 2018-02-15 RX ADMIN — Medication 100 MILLIGRAM(S): at 14:36

## 2018-02-15 RX ADMIN — Medication 20 MILLIGRAM(S): at 03:25

## 2018-02-15 RX ADMIN — SODIUM CHLORIDE 75 MILLILITER(S): 9 INJECTION, SOLUTION INTRAVENOUS at 20:56

## 2018-02-15 RX ADMIN — Medication 10 MILLIGRAM(S): at 01:44

## 2018-02-15 RX ADMIN — Medication 100 MILLIGRAM(S): at 05:59

## 2018-02-15 RX ADMIN — Medication 300 MILLIGRAM(S): at 08:46

## 2018-02-15 RX ADMIN — ENOXAPARIN SODIUM 40 MILLIGRAM(S): 100 INJECTION SUBCUTANEOUS at 12:32

## 2018-02-15 RX ADMIN — Medication 10 MILLIGRAM(S): at 02:17

## 2018-02-15 RX ADMIN — Medication 5 MILLIGRAM(S): at 01:00

## 2018-02-15 RX ADMIN — Medication 200 MILLIGRAM(S): at 23:35

## 2018-02-15 RX ADMIN — Medication 100 MILLIGRAM(S): at 22:00

## 2018-02-15 RX ADMIN — Medication 50 GM/HR: at 17:02

## 2018-02-15 NOTE — PROGRESS NOTE ADULT - SUBJECTIVE AND OBJECTIVE BOX
PGY4 Note  S: Pt evaluated at bedside for magnesium check. She denies visual disturbances, headache and right upper quadrant pain. Also denies nausea/vomiting/chest pain/epigastric pain/shortness of breath. Pain well controlled.     O:  T(C): 37.1 (18 @ 17:54), Max: 37.1 (18 @ 17:54)  HR: 83 (02-15-18 @ 05:44) (62 - 107)  BP: 138/63 (02-15-18 @ 05:31) (132/59 - 201/85)  RR: 18 (18 @ 17:54) (18 - 18)  SpO2: 93% (02-15-18 @ 05:44) (93% - 99%)  Vital Signs Last 24 Hrs  BP: 138/63 (15 Feb 2018 05:31) (132/59 - 201/85)  Daily Height in cm: 160.02 (2018 17:54)    Daily Baby A: Weight (gm) Delivery: 2340 (2018 19:37)     @ 07:01  -  02-15 @ 05:45  --------------------------------------------------------  IN: 1575 mL / OUT: 620 mL / NET: 955 mL      Gen: NAD, AAOx3  CV: RRR, no M/R/G  Pulm: CTAB, no R/R/W  Abd: obese, soft, nontender, no rebound or guarding, no epigastric tenderness, hypoactive BS; CJ dressing intact  : [place] draining  Ext: +1 edema belen, SCDs in place  Neuro: Reflexes 2+    Medication:  acetaminophen   Tablet 650 milliGRAM(s) Oral every 6 hours PRN  ceFAZolin   IVPB 2000 milliGRAM(s) IV Intermittent every 8 hours  citric acid/sodium citrate Solution 15 milliLiter(s) Oral every 4 hours  diphenhydrAMINE   Capsule 25 milliGRAM(s) Oral every 6 hours PRN  diphtheria/tetanus/pertussis (acellular) Vaccine (ADAcel) 0.5 milliLiter(s) IntraMuscular once  docusate sodium 100 milliGRAM(s) Oral two times a day PRN  enoxaparin Injectable 40 milliGRAM(s) SubCutaneous daily  ferrous    sulfate 325 milliGRAM(s) Oral daily  glycerin Suppository - Adult 1 Suppository(s) Rectal at bedtime PRN  ibuprofen  Tablet 600 milliGRAM(s) Oral every 6 hours PRN  ketorolac   Injectable 30 milliGRAM(s) IV Push once  lactated ringers Bolus 300 milliLiter(s) IV Bolus once  lactated ringers. 1000 milliLiter(s) IV Continuous <Continuous>  lanolin Ointment 1 Application(s) Topical every 3 hours PRN  magnesium sulfate Infusion 2 Gm/Hr IV Continuous <Continuous>  morphine PF Epidural 3 milliGRAM(s) Epidural once  ondansetron Injectable 4 milliGRAM(s) IV Push every 6 hours PRN  oxyCODONE    5 mG/acetaminophen 325 mG 1 Tablet(s) Oral every 3 hours PRN  oxyCODONE    5 mG/acetaminophen 325 mG 2 Tablet(s) Oral every 6 hours PRN  oxytocin Infusion 41.667 milliUNIT(s)/Min IV Continuous <Continuous>  oxytocin Infusion 333.333 milliUNIT(s)/Min IV Continuous <Continuous>  prenatal multivitamin 1 Tablet(s) Oral daily  simethicone 80 milliGRAM(s) Chew every 4 hours PRN  sodium chloride 0.65% Nasal 1 Spray(s) Both Nostrils three times a day PRN    No Known Allergies      Laboratory results:                        11.1   10.14 )-----------( 273      ( 2018 15:53 )             32.7         134<L>  |  106  |  13  ----------------------------<  65<L>  5.0   |  16<L>  |  0.8    Ca    7.9<L>      2018 15:53    TPro  4.7<L>  /  Alb  1.8<L>  /  TBili  0.7  /  DBili  x   /  AST  74<H>  /  ALT  57<H>  /  AlkPhos  277<H>      Fibrinogen Assay: >700.0 mg/dL (18 @ 17:55)    A/P 42yo  sp repeat c/s twins preeclampsia with severe features    on MgSO4  -no signs/symptoms of Magnesium toxicity  -continue monitoring I&O, Vitals  -continue monitoring  -reevaluate  -F/up am labs 0600  -consider starting PO anti-HTN    will let Dr Hart know

## 2018-02-15 NOTE — PROGRESS NOTE ADULT - SUBJECTIVE AND OBJECTIVE BOX
PGY2 note    Pt seen and examined at bedside for elevated BPs, s/p hydralzine 5mg IVP, repeat /81.  Now for hydralazine 10mg IVP.  Denies headaches, vision changes, RUQ/epigastric pain, increased extremity swelling.    T(F): 98.7  HR: 77  BP: 185/81  SpO2: 97%    Meds:  hydrALAZINE Injectable 10 milliGRAM(s) once @01:16      42yo  at 37w4d GA, s/p c/s for twin gestation, preeclampsia with severe features, on Mg+, with severely elevated BPs,  -IVP hydralazine 10mg  -repeat BP in 20min  -c/w Mg+ for seizure prophylaxis  -neuro checks    Dr Diaz aware, will inform Dr Adams

## 2018-02-15 NOTE — PROGRESS NOTE ADULT - SUBJECTIVE AND OBJECTIVE BOX
PGY1 Note    Pt seen and evaluated at bedside for neuro check, on Magnesium for preeclampsia with severe features. Denies headache, blurry vision, CP/SOB, palpitations, RUQ/epigastric pain, or increased swelling. s/p  delivery for di/di twins. Pain well controlled.     O:  T(C): 37.1 (02-15-18 @ 08:03), Max: 37.1 (02-15-18 @ 08:03)  HR: 82 (02-15-18 @ 08:48) (62 - 107)  BP: 156/68 (02-15-18 @ 08:46) (132/59 - 201/85)  RR: 97 (02-15-18 @ 08:03) (97 - 97)  SpO2: 96% (02-15-18 @ 08:48) (93% - 99%)  Wt(kg): --  Vital Signs Last 24 Hrs  BP: 156/68 (15 Feb 2018 08:46) (132/59 - 201/85)  Daily Height in cm: 160.02 (2018 17:54)    Daily Baby A: Weight (gm) Delivery: 2340 (2018 19:37)     @ 07:  -  02-15 @ 07:00  --------------------------------------------------------  IN: 1825 mL / OUT: 680 mL / NET: 1145 mL    02-15 @ 07:01  -  02-15 @ 08:54  --------------------------------------------------------  IN: 175 mL / OUT: 0 mL / NET: 175 mL      Gen: NAD, AAOx3  CV: RRR, no M/R/G  Pulm: CTAB, no R/R  Abd: soft, nontender, no rebound or guarding, no epigastric tenderness, liver nonpalpable +BS.   : [place]   Ext: +1 edema belen, SCDs in place  Neuro: Reflexes unable to elicit due to body habits    acetaminophen   Tablet 650 milliGRAM(s) Oral every 6 hours PRN  ceFAZolin   IVPB 2000 milliGRAM(s) IV Intermittent every 8 hours  citric acid/sodium citrate Solution 15 milliLiter(s) Oral every 4 hours  diphenhydrAMINE   Capsule 25 milliGRAM(s) Oral every 6 hours PRN  diphtheria/tetanus/pertussis (acellular) Vaccine (ADAcel) 0.5 milliLiter(s) IntraMuscular once  docusate sodium 100 milliGRAM(s) Oral two times a day PRN  enoxaparin Injectable 40 milliGRAM(s) SubCutaneous daily  ferrous    sulfate 325 milliGRAM(s) Oral daily  glycerin Suppository - Adult 1 Suppository(s) Rectal at bedtime PRN  ibuprofen  Tablet 600 milliGRAM(s) Oral every 6 hours PRN  ketorolac   Injectable 30 milliGRAM(s) IV Push once  labetalol 300 milliGRAM(s) Oral three times a day  lactated ringers Bolus 300 milliLiter(s) IV Bolus once  lactated ringers. 1000 milliLiter(s) IV Continuous <Continuous>  lanolin Ointment 1 Application(s) Topical every 3 hours PRN  magnesium sulfate Infusion 2 Gm/Hr IV Continuous <Continuous>  morphine PF Epidural 3 milliGRAM(s) Epidural once  ondansetron Injectable 4 milliGRAM(s) IV Push every 6 hours PRN  oxyCODONE    5 mG/acetaminophen 325 mG 1 Tablet(s) Oral every 3 hours PRN  oxyCODONE    5 mG/acetaminophen 325 mG 2 Tablet(s) Oral every 6 hours PRN  oxytocin Infusion 41.667 milliUNIT(s)/Min IV Continuous <Continuous>  oxytocin Infusion 333.333 milliUNIT(s)/Min IV Continuous <Continuous>  prenatal multivitamin 1 Tablet(s) Oral daily  simethicone 80 milliGRAM(s) Chew every 4 hours PRN  sodium chloride 0.65% Nasal 1 Spray(s) Both Nostrils three times a day PRN    No Known Allergies                            10.4   11.18 )-----------( 149      ( 15 Feb 2018 06:05 )             32.5     02-15    133<L>  |  106  |  14  ----------------------------<  91  4.8   |  17  |  0.9    Ca    7.5<L>      15 Feb 2018 06:05    TPro  4.2<L>  /  Alb  1.6<L>  /  TBili  x   /  DBili  x   /  AST  x   /  ALT  x   /  AlkPhos  x   02-15      42y s/p  delivery for twin IVF pregnancy, preeclampsia with severe features on Magnesium for seizure prophylaxis s/p multiple labetalol and hydralazine pushes, now on labetalol 300mg TID. Asymptomatic, BPs not currently in severe range  -monitor BPs  -neuro checks q2hr  -f/u labs  -strict I/Os  -c/w labetalol 300mg TID  -Mag for 24hrs PP (off at 2200 2/15) PGY1 Note    Pt seen and evaluated at bedside for neuro check, on Magnesium for preeclampsia with severe features. Denies headache, blurry vision, CP/SOB, palpitations, RUQ/epigastric pain, or increased swelling. s/p  delivery for di/di twins. Pain well controlled.     O:  T(C): 37.1 (02-15-18 @ 08:03), Max: 37.1 (02-15-18 @ 08:03)  HR: 82 (02-15-18 @ 08:48) (62 - 107)  BP: 156/68 (02-15-18 @ 08:46) (132/59 - 201/85)  RR: 97 (02-15-18 @ 08:03) (97 - 97)  SpO2: 96% (02-15-18 @ 08:48) (93% - 99%)  Wt(kg): --  Vital Signs Last 24 Hrs  BP: 156/68 (15 Feb 2018 08:46) (132/59 - 201/85)  Daily Height in cm: 160.02 (2018 17:54)    Daily Baby A: Weight (gm) Delivery: 2340 (2018 19:37)     @ 07:  -  02-15 @ 07:00  --------------------------------------------------------  IN: 1825 mL / OUT: 680 mL / NET: 1145 mL    02-15 @ 07:01  -  02-15 @ 08:54  --------------------------------------------------------  IN: 175 mL / OUT: 0 mL / NET: 175 mL      Gen: NAD, AAOx3  CV: RRR, no M/R/G  Pulm: CTAB, no R/R  Abd: soft, nontender, no rebound or guarding, no epigastric tenderness, liver nonpalpable +BS.   : [place]   Ext: +1 edema belen, SCDs in place  Neuro: Reflexes unable to elicit due to body habits    acetaminophen   Tablet 650 milliGRAM(s) Oral every 6 hours PRN  ceFAZolin   IVPB 2000 milliGRAM(s) IV Intermittent every 8 hours  citric acid/sodium citrate Solution 15 milliLiter(s) Oral every 4 hours  diphenhydrAMINE   Capsule 25 milliGRAM(s) Oral every 6 hours PRN  diphtheria/tetanus/pertussis (acellular) Vaccine (ADAcel) 0.5 milliLiter(s) IntraMuscular once  docusate sodium 100 milliGRAM(s) Oral two times a day PRN  enoxaparin Injectable 40 milliGRAM(s) SubCutaneous daily  ferrous    sulfate 325 milliGRAM(s) Oral daily  glycerin Suppository - Adult 1 Suppository(s) Rectal at bedtime PRN  ibuprofen  Tablet 600 milliGRAM(s) Oral every 6 hours PRN  ketorolac   Injectable 30 milliGRAM(s) IV Push once  labetalol 300 milliGRAM(s) Oral three times a day  lactated ringers Bolus 300 milliLiter(s) IV Bolus once  lactated ringers. 1000 milliLiter(s) IV Continuous <Continuous>  lanolin Ointment 1 Application(s) Topical every 3 hours PRN  magnesium sulfate Infusion 2 Gm/Hr IV Continuous <Continuous>  morphine PF Epidural 3 milliGRAM(s) Epidural once  ondansetron Injectable 4 milliGRAM(s) IV Push every 6 hours PRN  oxyCODONE    5 mG/acetaminophen 325 mG 1 Tablet(s) Oral every 3 hours PRN  oxyCODONE    5 mG/acetaminophen 325 mG 2 Tablet(s) Oral every 6 hours PRN  oxytocin Infusion 41.667 milliUNIT(s)/Min IV Continuous <Continuous>  oxytocin Infusion 333.333 milliUNIT(s)/Min IV Continuous <Continuous>  prenatal multivitamin 1 Tablet(s) Oral daily  simethicone 80 milliGRAM(s) Chew every 4 hours PRN  sodium chloride 0.65% Nasal 1 Spray(s) Both Nostrils three times a day PRN    No Known Allergies                            10.4   11.18 )-----------( 149      ( 15 Feb 2018 06:05 )             32.5     02-15    133<L>  |  106  |  14  ----------------------------<  91  4.8   |  17  |  0.9    Ca    7.5<L>      15 Feb 2018 06:05    TPro  4.2<L>  /  Alb  1.6<L>  /  TBili  x   /  DBili  x   /  AST  x   /  ALT  x   /  AlkPhos  x   02-15    Mg level (0600) 5.5      42y s/p  delivery for twin IVF pregnancy, preeclampsia with severe features on Magnesium for seizure prophylaxis s/p multiple labetalol and hydralazine pushes, now on labetalol 300mg TID. Asymptomatic, BPs not currently in severe range.  -monitor BPs  -neuro checks q2hr  -f/u labs  -strict I/Os  -c/w labetalol 300mg TID  -Mag for 24hrs PP (off at 2200 2/15) PGY1 Note    Pt seen and evaluated at bedside for neuro check, on Magnesium for preeclampsia with severe features. Denies headache, blurry vision, CP/SOB, palpitations, RUQ/epigastric pain, or increased swelling. s/p  delivery for di/di twins. Pain well controlled.     O:  T(C): 37.1 (02-15-18 @ 08:03), Max: 37.1 (02-15-18 @ 08:03)  HR: 82 (02-15-18 @ 08:48) (62 - 107)  BP: 156/68 (02-15-18 @ 08:46) (132/59 - 201/85)  RR: 97 (02-15-18 @ 08:03) (97 - 97)  SpO2: 96% (02-15-18 @ 08:48) (93% - 99%)  Wt(kg): --  Vital Signs Last 24 Hrs  BP: 156/68 (15 Feb 2018 08:46) (132/59 - 201/85)  Daily Height in cm: 160.02 (2018 17:54)    Daily Baby A: Weight (gm) Delivery: 2340 (2018 19:37)     @ 07:  -  02-15 @ 07:00  --------------------------------------------------------  IN: 1825 mL / OUT: 680 mL / NET: 1145 mL    02-15 @ 07:01  -  02-15 @ 08:54  --------------------------------------------------------  IN: 175 mL / OUT: 0 mL / NET: 175 mL    UO: 50cc, for the last 2 hours, adequate is 70cc/h.    Gen: NAD, AAOx3  CV: RRR, no M/R/G  Pulm: CTAB, no R/R  Abd: soft, nontender, no rebound or guarding, no epigastric tenderness, liver nonpalpable +BS.   : [place]   Ext: +1 edema belen, SCDs in place  Neuro: Reflexes unable to elicit due to body habits    acetaminophen   Tablet 650 milliGRAM(s) Oral every 6 hours PRN  ceFAZolin   IVPB 2000 milliGRAM(s) IV Intermittent every 8 hours  citric acid/sodium citrate Solution 15 milliLiter(s) Oral every 4 hours  diphenhydrAMINE   Capsule 25 milliGRAM(s) Oral every 6 hours PRN  diphtheria/tetanus/pertussis (acellular) Vaccine (ADAcel) 0.5 milliLiter(s) IntraMuscular once  docusate sodium 100 milliGRAM(s) Oral two times a day PRN  enoxaparin Injectable 40 milliGRAM(s) SubCutaneous daily  ferrous    sulfate 325 milliGRAM(s) Oral daily  glycerin Suppository - Adult 1 Suppository(s) Rectal at bedtime PRN  ibuprofen  Tablet 600 milliGRAM(s) Oral every 6 hours PRN  ketorolac   Injectable 30 milliGRAM(s) IV Push once  labetalol 300 milliGRAM(s) Oral three times a day  lactated ringers Bolus 300 milliLiter(s) IV Bolus once  lactated ringers. 1000 milliLiter(s) IV Continuous <Continuous>  lanolin Ointment 1 Application(s) Topical every 3 hours PRN  magnesium sulfate Infusion 2 Gm/Hr IV Continuous <Continuous>  morphine PF Epidural 3 milliGRAM(s) Epidural once  ondansetron Injectable 4 milliGRAM(s) IV Push every 6 hours PRN  oxyCODONE    5 mG/acetaminophen 325 mG 1 Tablet(s) Oral every 3 hours PRN  oxyCODONE    5 mG/acetaminophen 325 mG 2 Tablet(s) Oral every 6 hours PRN  oxytocin Infusion 41.667 milliUNIT(s)/Min IV Continuous <Continuous>  oxytocin Infusion 333.333 milliUNIT(s)/Min IV Continuous <Continuous>  prenatal multivitamin 1 Tablet(s) Oral daily  simethicone 80 milliGRAM(s) Chew every 4 hours PRN  sodium chloride 0.65% Nasal 1 Spray(s) Both Nostrils three times a day PRN    No Known Allergies                            10.4   11.18 )-----------( 149      ( 15 Feb 2018 06:05 )             32.5     02-15    133<L>  |  106  |  14  ----------------------------<  91  4.8   |  17  |  0.9    Ca    7.5<L>      15 Feb 2018 06:05    TPro  4.2<L>  /  Alb  1.6<L>  /  TBili  x   /  DBili  x   /  AST  x   /  ALT  x   /  AlkPhos  x   02-15    Mg level (0600) 5.5      42y s/p  delivery for twin IVF pregnancy, preeclampsia with severe features on Magnesium for seizure prophylaxis s/p multiple labetalol and hydralazine pushes, now on labetalol 300mg TID. Asymptomatic, BPs not currently in severe range.  -monitor BPs  -neuro checks q2hr  -f/u labs  -strict I/Os - UO low, will f/u in next hour.  -c/w labetalol 300mg TID  -Mag for 24hrs PP (off at 2200 2/15)    Dr. Centeno aware, Dr. Adams to be made aware. PGY1 Note    Pt seen and evaluated at bedside for neuro check, on Magnesium for preeclampsia with severe features. Denies headache, blurry vision, CP/SOB, palpitations, RUQ/epigastric pain, or increased swelling. s/p  delivery for di/di twins. Pain well controlled.     O:  T(C): 37.1 (02-15-18 @ 08:03), Max: 37.1 (02-15-18 @ 08:03)  HR: 82 (02-15-18 @ 08:48) (62 - 107)  BP: 156/68 (02-15-18 @ 08:46) (132/59 - 201/85)  RR: 97 (02-15-18 @ 08:03) (97 - 97)  SpO2: 96% (02-15-18 @ 08:48) (93% - 99%)  Wt(kg): --  Vital Signs Last 24 Hrs  BP: 156/68 (15 Feb 2018 08:46) (132/59 - 201/85)  Daily Height in cm: 160.02 (2018 17:54)    Daily Baby A: Weight (gm) Delivery: 2340 (2018 19:37)     @ 07:  -  02-15 @ 07:00  --------------------------------------------------------  IN: 1825 mL / OUT: 680 mL / NET: 1145 mL    02-15 @ 07:01  -  02-15 @ 08:54  --------------------------------------------------------  IN: 175 mL / OUT: 0 mL / NET: 175 mL    UO: 50cc, for the last 2 hours, adequate is 70cc/h.    Gen: NAD, AAOx3  CV: RRR, no M/R/G  Pulm: CTAB, no R/R  Abd: soft, nontender, no rebound or guarding, no epigastric tenderness, liver nonpalpable, no BS. Fundus difficult to palpate due to body habitus.   : [place]   Ext: +1 edema belen, SCDs in place  Neuro: Reflexes unable to elicit due to body habits    acetaminophen   Tablet 650 milliGRAM(s) Oral every 6 hours PRN  ceFAZolin   IVPB 2000 milliGRAM(s) IV Intermittent every 8 hours  citric acid/sodium citrate Solution 15 milliLiter(s) Oral every 4 hours  diphenhydrAMINE   Capsule 25 milliGRAM(s) Oral every 6 hours PRN  diphtheria/tetanus/pertussis (acellular) Vaccine (ADAcel) 0.5 milliLiter(s) IntraMuscular once  docusate sodium 100 milliGRAM(s) Oral two times a day PRN  enoxaparin Injectable 40 milliGRAM(s) SubCutaneous daily  ferrous    sulfate 325 milliGRAM(s) Oral daily  glycerin Suppository - Adult 1 Suppository(s) Rectal at bedtime PRN  ibuprofen  Tablet 600 milliGRAM(s) Oral every 6 hours PRN  ketorolac   Injectable 30 milliGRAM(s) IV Push once  labetalol 300 milliGRAM(s) Oral three times a day  lactated ringers Bolus 300 milliLiter(s) IV Bolus once  lactated ringers. 1000 milliLiter(s) IV Continuous <Continuous>  lanolin Ointment 1 Application(s) Topical every 3 hours PRN  magnesium sulfate Infusion 2 Gm/Hr IV Continuous <Continuous>  morphine PF Epidural 3 milliGRAM(s) Epidural once  ondansetron Injectable 4 milliGRAM(s) IV Push every 6 hours PRN  oxyCODONE    5 mG/acetaminophen 325 mG 1 Tablet(s) Oral every 3 hours PRN  oxyCODONE    5 mG/acetaminophen 325 mG 2 Tablet(s) Oral every 6 hours PRN  oxytocin Infusion 41.667 milliUNIT(s)/Min IV Continuous <Continuous>  oxytocin Infusion 333.333 milliUNIT(s)/Min IV Continuous <Continuous>  prenatal multivitamin 1 Tablet(s) Oral daily  simethicone 80 milliGRAM(s) Chew every 4 hours PRN  sodium chloride 0.65% Nasal 1 Spray(s) Both Nostrils three times a day PRN    No Known Allergies                            10.4   11.18 )-----------( 149      ( 15 Feb 2018 06:05 )             32.5     02-15    133<L>  |  106  |  14  ----------------------------<  91  4.8   |  17  |  0.9    Ca    7.5<L>      15 2018 06:05    TPro  4.2<L>  /  Alb  1.6<L>  /  TBili  x   /  DBili  x   /  AST  x   /  ALT  x   /  AlkPhos  x   02-15    Mg level (0600) 5.5      42y s/p  delivery for twin IVF pregnancy, preeclampsia with severe features on Magnesium for seizure prophylaxis s/p multiple labetalol and hydralazine pushes, now on labetalol 300mg TID. Asymptomatic, BPs not currently in severe range.  -monitor BPs  -neuro checks q2hr  -f/u labs  -strict I/Os - UO low, will f/u in next hour.  -c/w labetalol 300mg TID  -Mag for 24hrs PP (off at 2200 2/15)    Dr. Centeno aware, Dr. Adams to be made aware.

## 2018-02-15 NOTE — PROGRESS NOTE ADULT - SUBJECTIVE AND OBJECTIVE BOX
Subjective:   Pt evaluated at bedside for magnesium check. She denies visual disturbances, headache and right upper quadrant pain. Also denies nausea/vomiting/chest pain/epigastric pain/shortness of breath. Pain well controlled.     Objective:   T(F): 98.3 (02-15 @ 12:00), Max: 98.7 (02-15 @ 08:03)  HR: 74 (02-15 @ 16:38)  BP: 112/53 (02-15 @ 16:31) (107/54 - 158/68)  RR: 16 (02-15 @ 12:00)  SpO2: 96% (02-15 @ 16:38)  Vital Signs Last 24 Hrs  BP: 112/53 (15 Feb 2018 16:31) (107/54 - 201/85)      02-15 @ 07:01  -  02-15 @ 16:43  --------------------------------------------------------  IN: 1650 mL / OUT: 520 mL / NET: 1130 mL      Gen: NAD, AAOx3  CV: RRR, no M/R/G  Pulm: CTAB, no R/R/W  Abd: soft, gravid, nontender, no rebound or guarding, no epigastric tenderness, liver nonpalpable   Incision: C/D/I, dressing in place  : [place]   Ext: +2 edema belen, SCDs in place  Neuro: Reflexes 2+ upper    Medications:  enoxaparin Injectable 40 milliGRAM(s) SubCutaneous daily  labetalol 300 milliGRAM(s) Oral three times a day  magnesium sulfate Infusion 2 Gm/Hr IV Continuous <Continuous>  oxyCODONE    5 mG/acetaminophen 325 mG 1 Tablet(s) Oral every 3 hours PRN  oxyCODONE    5 mG/acetaminophen 325 mG 2 Tablet(s) Oral every 6 hours PRN    Labs:                        10.4   11.18 )-----------( 149      ( 15 Feb 2018 06:05 )             32.5     02-15    133<L>  |  106  |  14  ----------------------------<  91  4.8   |  17  |  0.9      TPro  4.2<L>  /  Alb  1.6<L>  /  TBili  0.4  /  DBili  x   /  AST  69<H>  /  ALT  53<H>  /  AlkPhos  235<H>  02-15    Magnesium, Serum: 6.0 mg/dL (02-15 @ 12:00)  Magnesium, Serum: 5.5 mg/dL (02-15 @ 06:05)  Fibrinogen Assay: >700.0 mg/dL ( @ 17:55)      Assessment:  42y s/p  for twin gestation,  on magnesium for preeclampsia with severe features    Plan:  -Continue magnesium until 2245pm tonight  -Repeat Mag level at 1800  -Neuro checks q2h  -Cont to monitor BPs and I/Os  -Pain management prn  -Cont seizure precautions

## 2018-02-15 NOTE — PROGRESS NOTE ADULT - SUBJECTIVE AND OBJECTIVE BOX
PGY4 Note  S: Pt evaluated at bedside for magnesium check. She denies visual disturbances, headache and right upper quadrant pain. Also denies nausea/vomiting/chest pain/epigastric pain/shortness of breath. Pain well controlled.     O:  T(C): 37.1 (02-14-18 @ 17:54), Max: 37.1 (02-14-18 @ 16:47)  HR: 70 (02-15-18 @ 00:58) (62 - 83)  BP: 171/67 (02-15-18 @ 00:45) (132/59 - 194/90)  RR: 18 (02-14-18 @ 17:54) (18 - 18)  SpO2: 97% (02-15-18 @ 00:58) (96% - 99%)  Vital Signs Last 24 Hrs  BP: 171/67 (15 Feb 2018 00:45) (132/59 - 194/90)  Daily Height in cm: 160.02 (14 Feb 2018 17:54)    Daily Baby A: Weight (gm) Delivery: 2340 (14 Feb 2018 19:37)    02-14 @ 07:01  -  02-15 @ 01:00  --------------------------------------------------------  IN: 950 mL / OUT: 435 mL / NET: 515 mL      Gen: NAD, AAOx3  CV: RRR, no M/R/G  Pulm: CTAB, no R/R/W  Abd: obese, soft, nontender, no rebound or guarding, no epigastric tenderness, hypoactive BS.   : [place]   Ext: +2 edema belen, SCDs in place  Neuro: Reflexes +2 LE bl    Medication:  acetaminophen   Tablet 650 milliGRAM(s) Oral every 6 hours PRN  ceFAZolin   IVPB 2000 milliGRAM(s) IV Intermittent every 8 hours  citric acid/sodium citrate Solution 15 milliLiter(s) Oral every 4 hours  diphenhydrAMINE   Capsule 25 milliGRAM(s) Oral every 6 hours PRN  diphtheria/tetanus/pertussis (acellular) Vaccine (ADAcel) 0.5 milliLiter(s) IntraMuscular once  docusate sodium 100 milliGRAM(s) Oral two times a day PRN  enoxaparin Injectable 40 milliGRAM(s) SubCutaneous daily  ferrous    sulfate 325 milliGRAM(s) Oral daily  glycerin Suppository - Adult 1 Suppository(s) Rectal at bedtime PRN  hydrALAZINE Injectable 5 milliGRAM(s) IV Push once  ibuprofen  Tablet 600 milliGRAM(s) Oral every 6 hours PRN  ketorolac   Injectable 30 milliGRAM(s) IV Push once  lactated ringers Bolus 300 milliLiter(s) IV Bolus once  lactated ringers. 1000 milliLiter(s) IV Continuous <Continuous>  lanolin Ointment 1 Application(s) Topical every 3 hours PRN  magnesium sulfate Infusion 2 Gm/Hr IV Continuous <Continuous>  morphine PF Epidural 3 milliGRAM(s) Epidural once  ondansetron Injectable 4 milliGRAM(s) IV Push every 6 hours PRN  oxyCODONE    5 mG/acetaminophen 325 mG 1 Tablet(s) Oral every 3 hours PRN  oxyCODONE    5 mG/acetaminophen 325 mG 2 Tablet(s) Oral every 6 hours PRN  oxytocin Infusion 41.667 milliUNIT(s)/Min IV Continuous <Continuous>  oxytocin Infusion 333.333 milliUNIT(s)/Min IV Continuous <Continuous>  prenatal multivitamin 1 Tablet(s) Oral daily  simethicone 80 milliGRAM(s) Chew every 4 hours PRN    No Known Allergies      Laboratory results:                        11.1   10.14 )-----------( 273      ( 14 Feb 2018 15:53 )             32.7     02-14    134<L>  |  106  |  13  ----------------------------<  65<L>  5.0   |  16<L>  |  0.8    Ca    7.9<L>      14 Feb 2018 15:53    TPro  4.7<L>  /  Alb  1.8<L>  /  TBili  0.7  /  DBili  x   /  AST  74<H>  /  ALT  57<H>  /  AlkPhos  277<H>  02-14    Fibrinogen Assay: >700.0 mg/dL (02-14-18 @ 17:55)      A/P 42 year old G P at Gestational Age   on MgSO4 for PTL or Preeclampsia  -no signs/symptoms of Magnesium toxicity  -continue monitoring I&O, Vitals  -continue monitoring  -reevaluate  -F/up Mg level     Dr rey PGY4 Note  S: Pt evaluated at bedside for magnesium check. She denies visual disturbances, headache and right upper quadrant pain. Also denies nausea/vomiting/chest pain/epigastric pain/shortness of breath. Pain well controlled.     O:  T(C): 37.1 (18 @ 17:54), Max: 37.1 (18 @ 16:47)  HR: 70 (02-15-18 @ 00:58) (62 - 83)  BP: 171/67 (02-15-18 @ 00:45) (132/59 - 194/90)  RR: 18 (18 @ 17:54) (18 - 18)  SpO2: 97% (02-15-18 @ 00:58) (96% - 99%)  Vital Signs Last 24 Hrs  BP: 171/67 (15 Feb 2018 00:45) (132/59 - 194/90)  Daily Height in cm: 160.02 (2018 17:54)    Daily Baby A: Weight (gm) Delivery: 2340 (2018 19:37)     @ 07:01  -  02-15 @ 01:00  --------------------------------------------------------  IN: 950 mL / OUT: 435 mL / NET: 515 mL      Gen: NAD, AAOx3  CV: RRR, no M/R/G  Pulm: CTAB, no R/R/W  Abd: obese, soft, nontender, no rebound or guarding, no epigastric tenderness, hypoactive BS.   : [place]   Ext: +2 edema belen, SCDs in place  Neuro: Reflexes +2 LE bl    Medication:  acetaminophen   Tablet 650 milliGRAM(s) Oral every 6 hours PRN  ceFAZolin   IVPB 2000 milliGRAM(s) IV Intermittent every 8 hours  citric acid/sodium citrate Solution 15 milliLiter(s) Oral every 4 hours  diphenhydrAMINE   Capsule 25 milliGRAM(s) Oral every 6 hours PRN  diphtheria/tetanus/pertussis (acellular) Vaccine (ADAcel) 0.5 milliLiter(s) IntraMuscular once  docusate sodium 100 milliGRAM(s) Oral two times a day PRN  enoxaparin Injectable 40 milliGRAM(s) SubCutaneous daily  ferrous    sulfate 325 milliGRAM(s) Oral daily  glycerin Suppository - Adult 1 Suppository(s) Rectal at bedtime PRN  hydrALAZINE Injectable 5 milliGRAM(s) IV Push once  ibuprofen  Tablet 600 milliGRAM(s) Oral every 6 hours PRN  ketorolac   Injectable 30 milliGRAM(s) IV Push once  lactated ringers Bolus 300 milliLiter(s) IV Bolus once  lactated ringers. 1000 milliLiter(s) IV Continuous <Continuous>  lanolin Ointment 1 Application(s) Topical every 3 hours PRN  magnesium sulfate Infusion 2 Gm/Hr IV Continuous <Continuous>  morphine PF Epidural 3 milliGRAM(s) Epidural once  ondansetron Injectable 4 milliGRAM(s) IV Push every 6 hours PRN  oxyCODONE    5 mG/acetaminophen 325 mG 1 Tablet(s) Oral every 3 hours PRN  oxyCODONE    5 mG/acetaminophen 325 mG 2 Tablet(s) Oral every 6 hours PRN  oxytocin Infusion 41.667 milliUNIT(s)/Min IV Continuous <Continuous>  oxytocin Infusion 333.333 milliUNIT(s)/Min IV Continuous <Continuous>  prenatal multivitamin 1 Tablet(s) Oral daily  simethicone 80 milliGRAM(s) Chew every 4 hours PRN    No Known Allergies      Laboratory results:                        11.1   10.14 )-----------( 273      ( 2018 15:53 )             32.7         134<L>  |  106  |  13  ----------------------------<  65<L>  5.0   |  16<L>  |  0.8    Ca    7.9<L>      2018 15:53    TPro  4.7<L>  /  Alb  1.8<L>  /  TBili  0.7  /  DBili  x   /  AST  74<H>  /  ALT  57<H>  /  AlkPhos  277<H>      Fibrinogen Assay: >700.0 mg/dL (18 @ 17:55)      A/P POD #0 42 year old  sp repeat c/s #2 twins with preeclampsia with severe features   on MgSO4   -administer hydralazine 10mg now for severe BPs  -no signs/symptoms of Magnesium toxicity  -continue monitoring I&O, Vitals  -continue monitoring  -reevaluate  -F/up Mg level and coags done at midnight    will let Dr Hailey whitlock

## 2018-02-15 NOTE — PROGRESS NOTE ADULT - SUBJECTIVE AND OBJECTIVE BOX
Subjective:   Pt evaluated at bedside for magnesium check. She denies visual disturbances, headache and right upper quadrant pain. Also denies nausea/vomiting/chest pain/epigastric pain/shortness of breath. Pain well controlled.     Objective:   Vital signs: T(F): 98.7 (02-15-18 @ 08:03), Max: 98.7 (02-15-18 @ 08:03)  HR: 80 (02-15-18 @ 12:13) (64 - 107)  BP: 139/69 (02-15-18 @ 12:01) (123/58 - 201/85)  RR: 97 (02-15-18 @ 08:03) (97 - 97)  SpO2: 97% (02-15-18 @ 12:13) (93% - 99%)      18 @ 07:01  -  02-15-18 @ 07:00  --------------------------------------------------------  IN: 0 mL / OUT: 680 mL / NET: -680 mL    Gen: NAD, AAOx3  CV: S1S2, RRR, no M/R/G  Pulm: CTAB, no rhonchi or rales or wheezing  Abd: soft, gravid, nontender, no rebound or guarding, no epigastric tenderness, liver nonpalpable +BS.   : [place]   Ext: SCDs in place, no tenderness to palpation  Neuro: 2+ UE reflexes    Medications:  ceFAZolin   IVPB: 100 mL/Hr IV Intermittent (02-15-18 @ 05:59)  ceFAZolin   IVPB: 100 mL/Hr IV Intermittent (18 @ 19:20)  hydrALAZINE: 10 milliGRAM(s) Oral (02-15-18 @ 02:17)  hydrALAZINE Injectable: 5 milliGRAM(s) IV Push (02-15-18 @ 01:00)  hydrALAZINE Injectable: 10 milliGRAM(s) IV Push (02-15-18 @ 01:44)  labetalol: 300 milliGRAM(s) Oral (02-15-18 @ 08:46)  labetalol Injectable: 20 milliGRAM(s) IV Push (02-15-18 @ 03:25)  labetalol Injectable: 40 milliGRAM(s) IV Push (18 @ 16:34)  labetalol Injectable: 20 milliGRAM(s) IV Push (18 @ 16:11)  labetalol Injectable: 80 milliGRAM(s) IV Push (18 @ 17:15)  magnesium sulfate  IVPB: 300 mL/Hr IV Intermittent (18 @ 17:00)  magnesium sulfate Infusion: 50 mL/Hr IV Continuous (18 @ 17:25)    Labs:                        10.4   11.18 )-----------( 149      ( 15 Feb 2018 06:05 )             32.5     02-15    133<L>  |  106  |  14  ----------------------------<  91  4.8   |  17  |  0.9    Ca    7.5<L>      15 Feb 2018 06:05  Mg     5.5     02-15    TPro  4.2<L>  /  Alb  1.6<L>  /  TBili  0.4  /  DBili  x   /  AST  69<H>  /  ALT  53<H>  /  AlkPhos  235<H>  02-15    Magnesium, Serum: 5.5 mg/dL (02-15-18 @ 06:05)  Uric Acid, Serum: 7.2 mg/dL (18 @ 15:53)    Assessment: 42y s/p  delivery for twin IVF pregnancy, preeclampsia with severe features on Magnesium for seizure prophylaxis s/p multiple labetalol and hydralazine pushes, now on labetalol 300mg TID. Asymptomatic, BPs not currently in severe range, doing well    Plan:  Continue magnesium  Repeat Mag level at 1200  Neuro checks q2h  Cont to monitor BPs and I/Os  Pain management prn  Cont seizure precautions  Cont efm/toco

## 2018-02-15 NOTE — PROGRESS NOTE ADULT - SUBJECTIVE AND OBJECTIVE BOX
S: Pt evaluated at bedside for magnesium check. She denies visual disturbances, headache and right upper quadrant pain. Also denies nausea/vomiting/chest pain/epigastric pain/shortness of breath. Pain well controlled.   Pt c/o nose bleed, reports shes had them recently, dry nares    HR: 88    BP: 187/84  (range 132/59 - 201/85)  RR: 18   SpO2: 97% (02-15-18 @ 03:18) (96% - 99%)    Vital Signs Last 24 Hrs  BP: 187/84 (15 Feb 2018 03:15) (132/59 - 201/85)  Daily Height in cm: 160.02 (14 Feb 2018 17:54)    Daily Baby A: Weight (gm) Delivery: 2340 (14 Feb 2018 19:37)    02-14 @ 07:01  -  02-15 @ 03:24  --------------------------------------------------------  IN: 1200 mL / OUT: 525 mL / NET: 675 mL      Gen: NAD, AAOx3  CV: RRR, no M/R/G  Pulm: CTAB, no R/R/W  Abd: soft, nontender, no rebound or guarding, no epigastric tenderness, liver nonpalpable +BS.   : [place]   Ext: +1 edema belen, SCDs in place  Neuro: Reflexes _2+ b/l lower ext__    postpartum  Mg+ IV   s/p hydralazine 5/10/10mg IVP    No Known Allergies                          11.1   10.14 )-----------( 273                   32.7       134<L>  |  106  |  13  ----------------------------<  65<L>  5.0   |  16<L>  |  0.8    Ca    7.9<L>      14 Feb 2018 15:53    TPro  4.7<L>  /  Alb  1.8<L>  /  TBili  0.7  /  DBili  x   /  AST  74<H>  /  ALT  57<H>  /  AlkPhos  277<H>  02-14      urine pr/cr pending    42y P2 s/p c/s for IVF twin gestation, preeclampsia with severe features, on Magnesium for seizure prophylaxis,   -monitor BPs, IVP labetalol 20mg now  -neuro checks q2hr  -next set of labs and Mg+ leve for 0600  -strict I/Os  -nasal spray     Dr Diaz aware, will inform Dr Hart

## 2018-02-15 NOTE — PROGRESS NOTE ADULT - SUBJECTIVE AND OBJECTIVE BOX
Pt evaluated at bedside for magnesium check. She denies visual disturbances, headache and right upper quadrant pain. Also denies nausea/vomiting/chest pain/epigastric pain/shortness of breath. Pain well controlled.     Objective:   Vital Signs Last 24 Hrs  T(C): 36.9 (15 Feb 2018 16:00), Max: 37.1 (15 Feb 2018 08:03)  T(F): 98.4 (15 Feb 2018 16:00), Max: 98.7 (15 Feb 2018 08:03)  HR: 65 (15 Feb 2018 19:31) (62 - 107)  BP: 127/61 (15 Feb 2018 19:31) (107/54 - 201/85)  RR: 16 (15 Feb 2018 16:00) (16 - 18)  SpO2: 93% (15 Feb 2018 19:31) (92% - 99%)	  Abd soft, nontender  Chest CTAB, RRR, NL S1/S2  Ext no calf pain or swelling, reflexes 2+    02-15 @ 07:01  -  02-15 @ 16:43  --------------------------------------------------------  IN: 1650 mL / OUT: 520 mL / NET: 1130 mL    Gen: NAD, AAOx3  CV: RRR, no M/R/G  Pulm: CTAB, no R/R/W  Abd: soft, gravid, nontender, no rebound or guarding, no epigastric tenderness, liver nonpalpable   Incision: C/D/I, dressing in place  : [place]   Ext: +2 edema belen, SCDs in place  Neuro: Reflexes 2+ upper    Medications:  enoxaparin Injectable 40 milliGRAM(s) SubCutaneous daily  labetalol 300 milliGRAM(s) Oral three times a day  magnesium sulfate Infusion 2 Gm/Hr IV Continuous <Continuous>  oxyCODONE    5 mG/acetaminophen 325 mG 1 Tablet(s) Oral every 3 hours PRN  oxyCODONE    5 mG/acetaminophen 325 mG 2 Tablet(s) Oral every 6 hours PRN    Labs:                        10.4   11.18 )-----------( 149      ( 15 Feb 2018 06:05 )             32.5     02-15    133<L>  |  106  |  14  ----------------------------<  91  4.8   |  17  |  0.9      TPro  4.2<L>  /  Alb  1.6<L>  /  TBili  0.4  /  DBili  x   /  AST  69<H>  /  ALT  53<H>  /  AlkPhos  235<H>  02-15    Magnesium, Serum: 6.0 mg/dL (02-15 @ 12:00)  Magnesium, Serum: 5.5 mg/dL (02-15 @ 06:05)  Fibrinogen Assay: >700.0 mg/dL ( @ 17:55)      Assessment:  42y s/p  for twin gestation,  on magnesium for preeclampsia with severe features    Plan:  -Continue magnesium until 2245pm tonight  -Repeat Mag level pending  -Neuro checks q2h  -Cont to monitor BPs and I/Os  -Pain management prn  -Cont seizure precautions    Dr. Boston aware, will inform Dr. Adams

## 2018-02-16 LAB
ALBUMIN SERPL ELPH-MCNC: 1.4 G/DL — LOW (ref 3–5.5)
ALP SERPL-CCNC: 194 U/L — HIGH (ref 30–115)
ALT FLD-CCNC: 39 U/L — SIGNIFICANT CHANGE UP (ref 0–41)
ANION GAP SERPL CALC-SCNC: 8 MMOL/L — SIGNIFICANT CHANGE UP (ref 7–14)
AST SERPL-CCNC: 46 U/L — HIGH (ref 0–41)
BILIRUB SERPL-MCNC: 0.6 MG/DL — SIGNIFICANT CHANGE UP (ref 0.2–1.2)
BUN SERPL-MCNC: 13 MG/DL — SIGNIFICANT CHANGE UP (ref 10–20)
CALCIUM SERPL-MCNC: 6.8 MG/DL — LOW (ref 8.5–10.1)
CHLORIDE SERPL-SCNC: 106 MMOL/L — SIGNIFICANT CHANGE UP (ref 98–110)
CO2 SERPL-SCNC: 20 MMOL/L — SIGNIFICANT CHANGE UP (ref 17–32)
CREAT SERPL-MCNC: 0.9 MG/DL — SIGNIFICANT CHANGE UP (ref 0.7–1.5)
GLUCOSE SERPL-MCNC: 92 MG/DL — SIGNIFICANT CHANGE UP (ref 70–110)
HCT VFR BLD CALC: 28 % — LOW (ref 37–47)
HGB BLD-MCNC: 9.4 G/DL — LOW (ref 14–18)
MCHC RBC-ENTMCNC: 29.2 PG — SIGNIFICANT CHANGE UP (ref 27–31)
MCHC RBC-ENTMCNC: 33.6 G/DL — SIGNIFICANT CHANGE UP (ref 32–37)
MCV RBC AUTO: 87 FL — SIGNIFICANT CHANGE UP (ref 81–91)
NRBC # BLD: 0 /100 WBCS — SIGNIFICANT CHANGE UP (ref 0–0)
PLATELET # BLD AUTO: 282 K/UL — SIGNIFICANT CHANGE UP (ref 130–400)
POTASSIUM SERPL-MCNC: 4.6 MMOL/L — SIGNIFICANT CHANGE UP (ref 3.5–5)
POTASSIUM SERPL-SCNC: 4.6 MMOL/L — SIGNIFICANT CHANGE UP (ref 3.5–5)
PRENATAL SYPHILIS TEST: SIGNIFICANT CHANGE UP
PROT SERPL-MCNC: 4 G/DL — LOW (ref 6–8)
RBC # BLD: 3.22 M/UL — LOW (ref 4.2–5.4)
RBC # FLD: 19.6 % — HIGH (ref 11.5–14.5)
SODIUM SERPL-SCNC: 134 MMOL/L — LOW (ref 135–146)
WBC # BLD: 10.12 K/UL — SIGNIFICANT CHANGE UP (ref 4.8–10.8)
WBC # FLD AUTO: 10.12 K/UL — SIGNIFICANT CHANGE UP (ref 4.8–10.8)

## 2018-02-16 RX ORDER — NIFEDIPINE 30 MG
10 TABLET, EXTENDED RELEASE 24 HR ORAL ONCE
Qty: 0 | Refills: 0 | Status: COMPLETED | OUTPATIENT
Start: 2018-02-16 | End: 2018-02-16

## 2018-02-16 RX ORDER — NIFEDIPINE 30 MG
90 TABLET, EXTENDED RELEASE 24 HR ORAL DAILY
Qty: 0 | Refills: 0 | Status: DISCONTINUED | OUTPATIENT
Start: 2018-02-16 | End: 2018-02-19

## 2018-02-16 RX ORDER — NIFEDIPINE 30 MG
30 TABLET, EXTENDED RELEASE 24 HR ORAL ONCE
Qty: 0 | Refills: 0 | Status: COMPLETED | OUTPATIENT
Start: 2018-02-16 | End: 2018-02-16

## 2018-02-16 RX ADMIN — Medication 30 MILLIGRAM(S): at 06:38

## 2018-02-16 RX ADMIN — OXYCODONE AND ACETAMINOPHEN 2 TABLET(S): 5; 325 TABLET ORAL at 17:27

## 2018-02-16 RX ADMIN — Medication 30 MILLIGRAM(S): at 22:28

## 2018-02-16 RX ADMIN — Medication 600 MILLIGRAM(S): at 07:15

## 2018-02-16 RX ADMIN — Medication 200 MILLIGRAM(S): at 06:38

## 2018-02-16 RX ADMIN — ENOXAPARIN SODIUM 40 MILLIGRAM(S): 100 INJECTION SUBCUTANEOUS at 16:34

## 2018-02-16 RX ADMIN — Medication 100 MILLIGRAM(S): at 22:31

## 2018-02-16 RX ADMIN — OXYCODONE AND ACETAMINOPHEN 2 TABLET(S): 5; 325 TABLET ORAL at 23:00

## 2018-02-16 RX ADMIN — Medication 10 MILLIGRAM(S): at 18:07

## 2018-02-16 RX ADMIN — Medication 600 MILLIGRAM(S): at 12:35

## 2018-02-16 RX ADMIN — Medication 600 MILLIGRAM(S): at 06:43

## 2018-02-16 RX ADMIN — Medication 100 MILLIGRAM(S): at 06:06

## 2018-02-16 RX ADMIN — Medication 600 MILLIGRAM(S): at 12:12

## 2018-02-16 NOTE — PROGRESS NOTE ADULT - SUBJECTIVE AND OBJECTIVE BOX
Post operative Note  PGY4    Subjective:  Patient reports feeling well. Ambulating without difficulty.  Patient is tolerating clear diet. Pain is well controlled. Patient passing flatus but did not have a bowel movement; voiding without difficulty.  She denies nausea and vomiting, shortness of breath, chest pain, or palpitations, no fever/chills, no urinary symptoms.    Physical exam:  Vital Signs Last 24 Hrs  T(C): 36.2 (2018 03:18), Max: 37.1 (15 Feb 2018 08:03)  T(F): 97.1 (2018 03:18), Max: 98.7 (15 Feb 2018 08:03)  HR: 76 (2018 05:46) (65 - 91)  BP: 150/66 (2018 05:46) (107/54 - 163/71)  RR: 18 (2018 03:18) (16 - 20)  SpO2: 98% (2018 01:30) (79% - 98%)  I&O's Summary    2018 07:01  -  15 Feb 2018 07:00  --------------------------------------------------------  IN: 1825 mL / OUT: 680 mL / NET: 1145 mL    15 Feb 2018 07:01  -  2018 06:21  --------------------------------------------------------  IN: 3000 mL / OUT: 2435 mL / NET: 565 mL      Gen: NAD, AAOX3  CVS:S1S2 wnl, RRR  Lungs: CTAB  Abdomen: obese, Soft, mild tenderness, no distention, bowel sounds x4, no rebound/guarding/rigidity.  CJ dressing: Clean, dry, and intact  Ext: No calf tenderness, edema 2+ bl LE    Laboratory Results:                        10.4   11.18 )-----------( 149      ( 15 Feb 2018 06:05 )             32.5                         11.1   10.14 )-----------( 273      ( 2018 15:53 )             32.7     Magnesium, Serum: 6.0 mg/dL (02-15 @ 12:00)    02-15-18 @ 06:05      133<L>  |  106  |  14  ----------------------------<  91  4.8   |  17  |  0.9    18 @ 15:53      134<L>  |  106  |  13  ----------------------------<  65<L>  5.0   |  16<L>  |  0.8        Ca    7.5<L>      15 Feb 2018 06:05  Ca    7.9<L>      2018 15:53  Mg     6.0<HH>     02-15  Mg     5.5<HH>     02-15    TPro  4.2<L>  /  Alb  1.6<L>  /  TBili  0.4  /  DBili  x   /  AST  69<H>  /  ALT  53<H>  /  AlkPhos  235<H>  02-15-18 @ 06:05  TPro  4.7<L>  /  Alb  1.8<L>  /  TBili  0.7  /  DBili  x   /  AST  74<H>  /  ALT  57<H>  /  AlkPhos  277<H>  18 @ 15:53        Urinalysis Basic - ( 2018 13:15 )    Color: Other / Appearance: Cloudy / SG: >=1.030 / pH: x  Gluc: x / Ketone: Negative  / Bili: Small / Urobili: 0.2   Blood: x / Protein: >=300 / Nitrite: Negative   Leuk Esterase: Negative / RBC: 2-5 /HPF / WBC 3-5 /HPF   Sq Epi: x / Non Sq Epi: Many /HPF / Bacteria: Many /HPF      PT/INR - ( 2018 17:55 )   PT: 9.90 sec;   INR: 0.92 ratio         PTT - ( 2018 17:55 )  PTT:32.2 sec  02-15    133<L>  |  106  |  14  ----------------------------<  91  4.8   |  17  |  0.9    Ca    7.5<L>      15 Feb 2018 06:05  Mg     6.0     02-15    TPro  4.2<L>  /  Alb  1.6<L>  /  TBili  0.4  /  DBili  x   /  AST  69<H>  /  ALT  53<H>  /  AlkPhos  235<H>  02-15    CAPILLARY BLOOD GLUCOSE  LIVER FUNCTIONS - ( 15 Feb 2018 06:05 )  Alb: 1.6 g/dL / Pro: 4.2 g/dL / ALK PHOS: 235 U/L / ALT: 53 U/L / AST: 69 U/L / GGT: x           Medications:  Home Medications:    MEDICATIONS  (STANDING):  enoxaparin Injectable 40 milliGRAM(s) SubCutaneous daily  ketorolac   Injectable 30 milliGRAM(s) IV Push once  labetalol 200 milliGRAM(s) Oral daily  morphine PF Epidural 3 milliGRAM(s) Epidural once  NIFEdipine XL 30 milliGRAM(s) Oral daily  oxytocin Infusion 41.667 milliUNIT(s)/Min (125 mL/Hr) IV Continuous <Continuous>    MEDICATIONS  (PRN):  acetaminophen   Tablet 650 milliGRAM(s) Oral every 6 hours PRN For Temp greater than 38.5 C (101.3 F)  diphenhydrAMINE   Capsule 25 milliGRAM(s) Oral every 6 hours PRN Itching  docusate sodium 100 milliGRAM(s) Oral two times a day PRN Stool Softening  glycerin Suppository - Adult 1 Suppository(s) Rectal at bedtime PRN Constipation  ibuprofen  Tablet 600 milliGRAM(s) Oral every 6 hours PRN Mild pain or headache  lanolin Ointment 1 Application(s) Topical every 3 hours PRN Sore Nipples  ondansetron Injectable 4 milliGRAM(s) IV Push every 6 hours PRN Nausea  oxyCODONE    5 mG/acetaminophen 325 mG 1 Tablet(s) Oral every 3 hours PRN Moderate Pain  oxyCODONE    5 mG/acetaminophen 325 mG 2 Tablet(s) Oral every 6 hours PRN Severe Pain  simethicone 80 milliGRAM(s) Chew every 4 hours PRN Gas  sodium chloride 0.65% Nasal 1 Spray(s) Both Nostrils three times a day PRN Nasal Congestion      Assessment and Plan  POD # 2 44yo P3 sp repeat  section for twins due to preeclampsia with severe features sp Magnesium  Neuro/Psych: continue PO meds  Cardiovascular: hemodynamically stable; HTN, start procardia 30xl this AM  Pulmonary: continue using incentive spirometry, encouraged OOB/Ambulate  GI: advance diet as tolerated   Renal/electrolytes: voiding  Hematology/Coags: H&H stable, transfusion not indicated, continue DVT prophylaxis  Disposition: continue routine post operative care       team with let Dr Schultz know

## 2018-02-17 ENCOUNTER — TRANSCRIPTION ENCOUNTER (OUTPATIENT)
Age: 43
End: 2018-02-17

## 2018-02-17 RX ORDER — LABETALOL HCL 100 MG
100 TABLET ORAL THREE TIMES A DAY
Qty: 0 | Refills: 0 | Status: DISCONTINUED | OUTPATIENT
Start: 2018-02-17 | End: 2018-02-17

## 2018-02-17 RX ORDER — LABETALOL HCL 100 MG
100 TABLET ORAL THREE TIMES A DAY
Qty: 0 | Refills: 0 | Status: DISCONTINUED | OUTPATIENT
Start: 2018-02-17 | End: 2018-02-18

## 2018-02-17 RX ORDER — NIFEDIPINE 30 MG
10 TABLET, EXTENDED RELEASE 24 HR ORAL ONCE
Qty: 0 | Refills: 0 | Status: COMPLETED | OUTPATIENT
Start: 2018-02-17 | End: 2018-02-17

## 2018-02-17 RX ADMIN — Medication 100 MILLIGRAM(S): at 22:04

## 2018-02-17 RX ADMIN — OXYCODONE AND ACETAMINOPHEN 2 TABLET(S): 5; 325 TABLET ORAL at 03:47

## 2018-02-17 RX ADMIN — OXYCODONE AND ACETAMINOPHEN 2 TABLET(S): 5; 325 TABLET ORAL at 18:40

## 2018-02-17 RX ADMIN — OXYCODONE AND ACETAMINOPHEN 2 TABLET(S): 5; 325 TABLET ORAL at 18:12

## 2018-02-17 RX ADMIN — Medication 90 MILLIGRAM(S): at 06:08

## 2018-02-17 RX ADMIN — OXYCODONE AND ACETAMINOPHEN 2 TABLET(S): 5; 325 TABLET ORAL at 02:44

## 2018-02-17 RX ADMIN — Medication 10 MILLIGRAM(S): at 11:58

## 2018-02-17 RX ADMIN — ENOXAPARIN SODIUM 40 MILLIGRAM(S): 100 INJECTION SUBCUTANEOUS at 13:58

## 2018-02-17 RX ADMIN — OXYCODONE AND ACETAMINOPHEN 1 TABLET(S): 5; 325 TABLET ORAL at 12:10

## 2018-02-17 RX ADMIN — Medication 100 MILLIGRAM(S): at 17:16

## 2018-02-17 NOTE — PROGRESS NOTE ADULT - SUBJECTIVE AND OBJECTIVE BOX
PGY 3 Post Partum note    Subjective: pt seen and examined at bedside, reports mild headache at this time and soreness in abdomen. Denies fevers, chills, nausea, vomiting, confusion, dizziness, CP, SOB, VB. Presently bottle feeding. Blood pressures still elevated. Patient desires regular diet. positive flatus.     Physical exam:  Vital Signs Last 24 Hrs  T(F): 96.6 (17 Feb 2018 07:40), Max: 97.2 (16 Feb 2018 23:57)  HR: 75 (17 Feb 2018 07:40) (75 - 84)  BP: 154/70 (17 Feb 2018 08:43) (142/80 - 166/79)  RR: 20 (17 Feb 2018 07:40) (19 - 20)    Gen: NAD  CVS: s1s2, rrr   Lungs: ctab, no rhonchi or rales  Abdomen: Soft, nontender, no distension , firm uterine fundus at umbilicus. CJ in place, clean and dry  Pelvic: Normal lochia noted  Ext: No calf tenderness, pitting edema    Diet:  docusate sodium   100 milliGRAM(s) Oral (02-16-18 @ 22:31)  enoxaparin Injectable   40 milliGRAM(s) SubCutaneous (02-16-18 @ 16:34)  ibuprofen  Tablet   600 milliGRAM(s) Oral (02-16-18 @ 12:12)  NIFEdipine IR   10 milliGRAM(s) Oral (02-16-18 @ 18:07)  NIFEdipine IR   30 milliGRAM(s) Oral (02-16-18 @ 22:28)  NIFEdipine XL   90 milliGRAM(s) Oral (02-17-18 @ 06:08)  oxyCODONE    5 mG/acetaminophen 325 mG   2 Tablet(s) Oral (02-17-18 @ 02:44)   2 Tablet(s) Oral (02-16-18 @ 17:27)    LABS:                        9.4    10.12 )-----------( 282      ( 16 Feb 2018 09:18 )             28.0       02-16-18 @ 09:18      134<L>  |  106  |  13  ----------------------------<  92  4.6   |  20  |  0.9    02-15-18 @ 06:05      133<L>  |  106  |  14  ----------------------------<  91  4.8   |  17  |  0.9    02-14-18 @ 15:53      134<L>  |  106  |  13  ----------------------------<  65<L>  5.0   |  16<L>  |  0.8        Ca    6.8<L>      16 Feb 2018 09:18  Ca    7.5<L>      15 Feb 2018 06:05  Ca    7.9<L>      14 Feb 2018 15:53  Mg     6.0<HH>     02-15  Mg     5.5<HH>     02-15    TPro  4.0<L>  /  Alb  1.4<L>  /  TBili  0.6  /  DBili  x   /  AST  46<H>  /  ALT  39  /  AlkPhos  194<H>  02-16-18 @ 09:18  TPro  4.2<L>  /  Alb  1.6<L>  /  TBili  0.4  /  DBili  x   /  AST  69<H>  /  ALT  53<H>  /  AlkPhos  235<H>  02-15-18 @ 06:05  TPro  4.7<L>  /  Alb  1.8<L>  /  TBili  0.7  /  DBili  x   /  AST  74<H>  /  ALT  57<H>  /  AlkPhos  277<H>  02-14-18 @ 15:53 PGY 3 Post Partum note    Subjective: pt seen and examined at bedside, reports mild headache at this time and soreness in abdomen and lower back. Denies fevers, chills, nausea, vomiting, confusion, dizziness, CP, SOB, VB. Presently bottle feeding. Blood pressures still elevated, some severe range requiring treatment last night. Tolerating PO intake. Positive flatus and BM.     Physical exam:  Vital Signs Last 24 Hrs  T(F): 96.6 (17 Feb 2018 07:40), Max: 97.2 (16 Feb 2018 23:57)  HR: 75 (17 Feb 2018 07:40) (75 - 84)  BP: 154/70 (17 Feb 2018 08:43) (142/80 - 166/79)  RR: 20 (17 Feb 2018 07:40) (19 - 20)    Gen: NAD  CVS: s1s2, rrr   Lungs: ctab, no rhonchi or rales  Abdomen: Soft, nontender, no distension , firm uterine fundus at umbilicus. CJ in place, clean and dry  Integumentary: Bruising and tenderness midline lower back around epidural site.  Pelvic: Normal lochia noted  Ext: No calf tenderness, pitting edema    Diet:  docusate sodium   100 milliGRAM(s) Oral (02-16-18 @ 22:31)  enoxaparin Injectable   40 milliGRAM(s) SubCutaneous (02-16-18 @ 16:34)  ibuprofen  Tablet   600 milliGRAM(s) Oral (02-16-18 @ 12:12)  NIFEdipine IR   10 milliGRAM(s) Oral (02-16-18 @ 18:07)  NIFEdipine IR   30 milliGRAM(s) Oral (02-16-18 @ 22:28)  NIFEdipine XL   90 milliGRAM(s) Oral (02-17-18 @ 06:08)  oxyCODONE    5 mG/acetaminophen 325 mG   2 Tablet(s) Oral (02-17-18 @ 02:44)   2 Tablet(s) Oral (02-16-18 @ 17:27)    LABS:                        9.4    10.12 )-----------( 282      ( 16 Feb 2018 09:18 )             28.0       02-16-18 @ 09:18      134<L>  |  106  |  13  ----------------------------<  92  4.6   |  20  |  0.9    02-15-18 @ 06:05      133<L>  |  106  |  14  ----------------------------<  91  4.8   |  17  |  0.9    02-14-18 @ 15:53      134<L>  |  106  |  13  ----------------------------<  65<L>  5.0   |  16<L>  |  0.8        Ca    6.8<L>      16 Feb 2018 09:18  Ca    7.5<L>      15 Feb 2018 06:05  Ca    7.9<L>      14 Feb 2018 15:53  Mg     6.0<HH>     02-15  Mg     5.5<HH>     02-15    TPro  4.0<L>  /  Alb  1.4<L>  /  TBili  0.6  /  DBili  x   /  AST  46<H>  /  ALT  39  /  AlkPhos  194<H>  02-16-18 @ 09:18  TPro  4.2<L>  /  Alb  1.6<L>  /  TBili  0.4  /  DBili  x   /  AST  69<H>  /  ALT  53<H>  /  AlkPhos  235<H>  02-15-18 @ 06:05  TPro  4.7<L>  /  Alb  1.8<L>  /  TBili  0.7  /  DBili  x   /  AST  74<H>  /  ALT  57<H>  /  AlkPhos  277<H>  02-14-18 @ 15:53

## 2018-02-17 NOTE — PROGRESS NOTE ADULT - ASSESSMENT
43 yo P3 s/p  for twin gestation POD 3, pre-eclampsia with severe features, GDMA1, with elevated BPs, now on procardia 90XL  - continue with BP monitoring q2-4 hours  - pain management PRN  - ambulate as tolerated   - regular diet  - will reassess as needed  - will discuss with Dr. Adams 41 yo P3 s/p  for twin gestation POD 3, pre-eclampsia with severe features, GDMA1, with elevated BPs, now on procardia 90XL  - continue with BP monitoring q2-4 hours  - pain management PRN  - ambulate as tolerated   - regular diet  - warm compresses to lower back  - will reassess as needed  - consider IM consult if BPs remain in severe range despite increased Procardia dose  - will discuss with Dr. Adams

## 2018-02-17 NOTE — CONSULT NOTE ADULT - SUBJECTIVE AND OBJECTIVE BOX
42yo  at 37w4d GA, twin gestation, by IVF admitted for pre-eclampsia. Now POD #3 from C Section. Delivered a set of healthy twins.   Was on Magnesium infusion around the time of labor. Now completed.   BP still ranging 170s/late 80s- early 90s. Despite Nifedipine XL 90 mg QD.   IM consulted for better BP control.     Has B/l LE Edema since 3 weeks. +Proteinuria.   No Headaches/bblurry vision/RUQ/Epigastric Pain/N/V/ Seizures. No Chest pain/palpitations/SOB.     PMH:   Endometriosis    PSxH :  Endometriosis surgery  Recent C SEction    NKDA    Home Meds :  Was on lovenox during pregnancy and switched to heparin recently (d/t IVF)    Family H:  Premature CAD in GRandfather at Age 57    Social H:  No h/o Tobacco/Alcohol/IVDA      Vital Signs Last 24 Hrs  T(C): 35.9 (2018 11:48), Max: 36.2 (2018 23:57)  T(F): 96.7 (2018 11:48), Max: 97.2 (2018 23:57)  HR: 89 (2018 11:48) (75 - 89)  BP: 187/79 (2018 12:50) (142/80 - 187/79)  BP(mean): --  RR: 20 (2018 11:48) (19 - 20)  SpO2: --  PHYSICAL EXAM:    Constitutional: NAD, awake and alert, well-developed  HEENT: PERR, EOMI, Normal Hearing, MMM  Neck: Soft and supple, No LAD, No JVD  Respiratory: Breath sounds are clear bilaterally, No wheezing, rales or rhonchi  Cardiovascular: S1 and S2, regular rate and rhythm, no Murmurs, gallops or rubs  Gastrointestinal: Bowel Sounds present, soft, nontender, nondistended, no guarding, no rebound  Extremities: No peripheral edema  Vascular: 2+ peripheral pulses  Neurological: A/O x 3, no focal deficits  Musculoskeletal: 5/5 strength b/l upper and lower extremities  Skin: No rashes    MEDICATIONS:  MEDICATIONS  (STANDING):  enoxaparin Injectable 40 milliGRAM(s) SubCutaneous daily  ketorolac   Injectable 30 milliGRAM(s) IV Push once  morphine PF Epidural 3 milliGRAM(s) Epidural once  NIFEdipine XL 90 milliGRAM(s) Oral daily  oxytocin Infusion 41.667 milliUNIT(s)/Min (125 mL/Hr) IV Continuous <Continuous>      LABS: All Labs Reviewed:                        9.4    10.12 )-----------( 282      ( 2018 09:18 )             28.0     02-16    134<L>  |  106  |  13  ----------------------------<  92  4.6   |  20  |  0.9    Ca    6.8<L>      2018 09:18    TPro  4.0<L>  /  Alb  1.4<L>  /  TBili  0.6  /  DBili  x   /  AST  46<H>  /  ALT  39  /  AlkPhos  194<H>  16

## 2018-02-18 RX ORDER — LABETALOL HCL 100 MG
300 TABLET ORAL ONCE
Qty: 0 | Refills: 0 | Status: COMPLETED | OUTPATIENT
Start: 2018-02-18 | End: 2018-02-18

## 2018-02-18 RX ORDER — LABETALOL HCL 100 MG
TABLET ORAL
Qty: 0 | Refills: 0 | Status: DISCONTINUED | OUTPATIENT
Start: 2018-02-18 | End: 2018-02-18

## 2018-02-18 RX ORDER — NIFEDIPINE 30 MG
30 TABLET, EXTENDED RELEASE 24 HR ORAL ONCE
Qty: 0 | Refills: 0 | Status: COMPLETED | OUTPATIENT
Start: 2018-02-18 | End: 2018-02-18

## 2018-02-18 RX ORDER — LABETALOL HCL 100 MG
300 TABLET ORAL THREE TIMES A DAY
Qty: 0 | Refills: 0 | Status: DISCONTINUED | OUTPATIENT
Start: 2018-02-18 | End: 2018-02-19

## 2018-02-18 RX ORDER — LABETALOL HCL 100 MG
200 TABLET ORAL THREE TIMES A DAY
Qty: 0 | Refills: 0 | Status: DISCONTINUED | OUTPATIENT
Start: 2018-02-18 | End: 2018-02-18

## 2018-02-18 RX ORDER — LABETALOL HCL 100 MG
300 TABLET ORAL THREE TIMES A DAY
Qty: 0 | Refills: 0 | Status: DISCONTINUED | OUTPATIENT
Start: 2018-02-18 | End: 2018-02-18

## 2018-02-18 RX ADMIN — Medication 300 MILLIGRAM(S): at 21:50

## 2018-02-18 RX ADMIN — ENOXAPARIN SODIUM 40 MILLIGRAM(S): 100 INJECTION SUBCUTANEOUS at 11:08

## 2018-02-18 RX ADMIN — OXYCODONE AND ACETAMINOPHEN 2 TABLET(S): 5; 325 TABLET ORAL at 00:26

## 2018-02-18 RX ADMIN — Medication 200 MILLIGRAM(S): at 06:46

## 2018-02-18 RX ADMIN — OXYCODONE AND ACETAMINOPHEN 2 TABLET(S): 5; 325 TABLET ORAL at 12:40

## 2018-02-18 RX ADMIN — Medication 600 MILLIGRAM(S): at 13:10

## 2018-02-18 RX ADMIN — OXYCODONE AND ACETAMINOPHEN 2 TABLET(S): 5; 325 TABLET ORAL at 01:00

## 2018-02-18 RX ADMIN — Medication 90 MILLIGRAM(S): at 06:05

## 2018-02-18 RX ADMIN — Medication 600 MILLIGRAM(S): at 12:40

## 2018-02-18 RX ADMIN — Medication 30 MILLIGRAM(S): at 17:46

## 2018-02-18 RX ADMIN — Medication 300 MILLIGRAM(S): at 12:40

## 2018-02-18 NOTE — PROGRESS NOTE ADULT - SUBJECTIVE AND OBJECTIVE BOX
Postpartum Note,  Section  She is a  42y woman who is now post-operative day: 4, c/s for twin IVF pregnancy, preeclampsia with severe features.    Subjective:  The patient feels well.  She is ambulating.   She is tolerating regular diet.  She denies nausea and vomiting.  She is voiding.  Her pain is controlled.  She reports normal postpartum bleeding.  She is formula feeding.    Pt denies headaches, vision changes, RUQ/epigastric pain, increased extremity swelling.  Has bilateral swelling of lower extremities that has been unchanged x1 week.  Reports no leg pain with ambulation, no chest pain or SOB.    Physical exam:    Vital Signs   T(F): 97.9   HR: 87   BP: 182/81  BP range: 154/70 - 187/79  RR: 19  I&O's Summary    2018 07:01  -  2018 06:53  --------------------------------------------------------  IN: 0 mL / OUT: 1350 mL / NET: -1350 mL        Gen: NAD  Breast: Soft, nontender, not engorged.  Abdomen: Soft, nontender, no distension , firm uterine fundus at umbilicus.  Incision: Clean, dry, CJ dressing in place  Pelvic: Normal lochia noted  Ext: bilateral swelling of lower extremities, equal bilaterally, no calf tenderness    LABS:                        9.4    10.12 )-----------( 282                 28.0       134 |  106  |  13  ----------------------------<  92  4.6   |  20  |  0.9        Ca    6.8<L>      2018 09:18  Mg     6.0     02-15    TPro  4.0<L>  /  Alb  1.4<L>  /  TBili  0.6  /  DBili  x   /  AST  46<H>  /  ALT  39  /  AlkPhos  194<H>  18 @ 09:18      MEDICATIONS  (STANDING):  enoxaparin Injectable 40 milliGRAM(s) SubCutaneous daily  labetalol 200 milliGRAM(s) Oral three times a day --> given 0645  NIFEdipine XL 90 milliGRAM(s) Oral daily --> given 605      Assessment and Plan  POD #4 s/p  section for twin IVF pregnancy, preeclampsia with severe features, s/p Mg+ 24hrs postpartum, with difficult to control BPs, s/p IM consult,  -BPs q4hrs  -nifedipine 90XL, labetalol 200mg TID  -increase labetalol as needed  -encourage ambulation  -pain control  -possible d/c today if BPs better controlled with medicine follow up in 3-7 days    Dr Siddiqi aware, will inform Dr Hart Postpartum Note,  Section  She is a  42y woman who is now post-operative day: 4, c/s for twin IVF pregnancy, preeclampsia with severe features.    Subjective:  The patient feels well.  She is ambulating.   She is tolerating regular diet.  She denies nausea and vomiting.  She is voiding.  Her pain is controlled.  She reports normal postpartum bleeding.  She is formula feeding.    Pt denies headaches, vision changes, RUQ/epigastric pain, increased extremity swelling.  Has bilateral swelling of lower extremities that has been unchanged x1 week.  Reports no leg pain with ambulation, no chest pain or SOB.    Physical exam:    Vital Signs   T(F): 97.9   HR: 87   BP: 182/81  BP range: 154/70 - 187/79  RR: 19  I&O's Summary    2018 07:01  -  2018 06:53  --------------------------------------------------------  IN: 0 mL / OUT: 1350 mL / NET: -1350 mL        Gen: NAD  Breast: Soft, nontender, not engorged.  Abdomen: Soft, nontender, no distension , firm uterine fundus at umbilicus.  Incision: Clean, dry, CJ dressing in place  Pelvic: Normal lochia noted  Ext: bilateral swelling of lower extremities, equal bilaterally, no calf tenderness    LABS:                        9.4    10.12 )-----------( 282                 28.0       134 |  106  |  13  ----------------------------<  92  4.6   |  20  |  0.9        Ca    6.8<L>      2018 09:18  Mg     6.0     02-15    TPro  4.0<L>  /  Alb  1.4<L>  /  TBili  0.6  /  DBili  x   /  AST  46<H>  /  ALT  39  /  AlkPhos  194<H>  18 @ 09:18      MEDICATIONS  (STANDING):  enoxaparin Injectable 40 milliGRAM(s) SubCutaneous daily  labetalol 200 milliGRAM(s) Oral three times a day --> given 0645  NIFEdipine XL 90 milliGRAM(s) Oral daily --> given 605      Assessment and Plan  POD #4 s/p  section for twin IVF pregnancy, preeclampsia with severe features, s/p Mg+ 24hrs postpartum, with difficult to control BPs, s/p IM consult,  -BPs q4hrs  -nifedipine 90XL, labetalol 200mg TID  -increase labetalol as needed  -encourage ambulation  -pain control  -d/c when BPs better controlled, ideally 24h without severe range reading. Medicine follow up in 3-7 days    Dr Siddiqi aware, will inform Dr Adams

## 2018-02-18 NOTE — PROGRESS NOTE ADULT - SUBJECTIVE AND OBJECTIVE BOX
PGY1 Note    Pt seen and examined at bedside due to consistently elevated BPs. Pt on currently on Procardia 90xl, labetalol 300mg TID. s/p stat Procardia 30mg (immediate release) for elevated BP at 1800, with follow up /78. Denies headache, blury vision, CP, SOB, epigastric/RUQ pain. Reports consistent bilateral LE swelling, but no pain. s/p doppler, pending read.    PE  Vital Signs Last 24 Hrs  T(F): 97.6 (2018 16:45), Max: 97.9 (2018 05:16)  HR: 110 (2018 16:45) (77 - 110)  BP: 178/81 (2018 20:26) (159/70 - 186/83)  RR: 18 (2018 16:45) (18 - 19)    Gen: NAD, AAOx3  CVS: RRR, normal S1, S2  Lungs: CTAB  Abd: soft, non tender, CJ dressing in place.   Lochia: minimal  LE: 2+ bilateral edema    Labs  none new    Meds  MEDICATIONS  (STANDING):  enoxaparin Injectable 40 milliGRAM(s) SubCutaneous daily  labetalol 300 milliGRAM(s) Oral three times a day  morphine PF Epidural 3 milliGRAM(s) Epidural once  NIFEdipine XL 90 milliGRAM(s) Oral daily    Imaging  LE doppler pending read    A/P  HPI:  44yo , GDMA1, s/p  delivery for twins with preeclampsia with severe features, s/p Magnesium, with consistently severely elevated blood pressures despite increasing doses of antihypertensives. Asymptomatic.  -s/p Med consult: c/w procardia 90XL, labetalol 300mg BID  -will consider increasing procardia to 120XL in AM, or adding HCTZ/lasix  -BPs q2 hr  -c/w postpartum care.  -f/u doppler read    Dr. Boston and Dr. Adams aware.

## 2018-02-19 LAB
ALBUMIN SERPL ELPH-MCNC: 1.9 G/DL — LOW (ref 3–5.5)
ALP SERPL-CCNC: 169 U/L — HIGH (ref 30–115)
ALT FLD-CCNC: 20 U/L — SIGNIFICANT CHANGE UP (ref 0–41)
ANION GAP SERPL CALC-SCNC: 7 MMOL/L — SIGNIFICANT CHANGE UP (ref 7–14)
AST SERPL-CCNC: 18 U/L — SIGNIFICANT CHANGE UP (ref 0–41)
BILIRUB SERPL-MCNC: 0.7 MG/DL — SIGNIFICANT CHANGE UP (ref 0.2–1.2)
BUN SERPL-MCNC: 6 MG/DL — LOW (ref 10–20)
CALCIUM SERPL-MCNC: 8.3 MG/DL — LOW (ref 8.5–10.1)
CHLORIDE SERPL-SCNC: 107 MMOL/L — SIGNIFICANT CHANGE UP (ref 98–110)
CO2 SERPL-SCNC: 26 MMOL/L — SIGNIFICANT CHANGE UP (ref 17–32)
CREAT ?TM UR-MCNC: 83 MG/DL — SIGNIFICANT CHANGE UP
CREAT SERPL-MCNC: 0.5 MG/DL — LOW (ref 0.7–1.5)
GLUCOSE SERPL-MCNC: 88 MG/DL — SIGNIFICANT CHANGE UP (ref 70–110)
HCT VFR BLD CALC: 28.6 % — LOW (ref 37–47)
HGB BLD-MCNC: 9.3 G/DL — LOW (ref 14–18)
MCHC RBC-ENTMCNC: 28.6 PG — SIGNIFICANT CHANGE UP (ref 27–31)
MCHC RBC-ENTMCNC: 32.5 G/DL — SIGNIFICANT CHANGE UP (ref 32–37)
MCV RBC AUTO: 88 FL — SIGNIFICANT CHANGE UP (ref 81–91)
NRBC # BLD: 0 /100 WBCS — SIGNIFICANT CHANGE UP (ref 0–0)
PLATELET # BLD AUTO: 393 K/UL — SIGNIFICANT CHANGE UP (ref 130–400)
POTASSIUM SERPL-MCNC: 4.6 MMOL/L — SIGNIFICANT CHANGE UP (ref 3.5–5)
POTASSIUM SERPL-SCNC: 4.6 MMOL/L — SIGNIFICANT CHANGE UP (ref 3.5–5)
PROT ?TM UR-MCNC: 418 MG/DL — SIGNIFICANT CHANGE UP
PROT SERPL-MCNC: 4.7 G/DL — LOW (ref 6–8)
PROT/CREAT UR-RTO: 5 RATIO — HIGH (ref 0–0.2)
RBC # BLD: 3.25 M/UL — LOW (ref 4.2–5.4)
RBC # FLD: 18.2 % — HIGH (ref 11.5–14.5)
SODIUM SERPL-SCNC: 140 MMOL/L — SIGNIFICANT CHANGE UP (ref 135–146)
WBC # BLD: 5.71 K/UL — SIGNIFICANT CHANGE UP (ref 4.8–10.8)
WBC # FLD AUTO: 5.71 K/UL — SIGNIFICANT CHANGE UP (ref 4.8–10.8)

## 2018-02-19 RX ORDER — FUROSEMIDE 40 MG
20 TABLET ORAL ONCE
Qty: 0 | Refills: 0 | Status: DISCONTINUED | OUTPATIENT
Start: 2018-02-19 | End: 2018-02-19

## 2018-02-19 RX ORDER — NIFEDIPINE 30 MG
90 TABLET, EXTENDED RELEASE 24 HR ORAL AT BEDTIME
Qty: 0 | Refills: 0 | Status: DISCONTINUED | OUTPATIENT
Start: 2018-02-20 | End: 2018-02-20

## 2018-02-19 RX ORDER — LABETALOL HCL 100 MG
300 TABLET ORAL THREE TIMES A DAY
Qty: 0 | Refills: 0 | Status: DISCONTINUED | OUTPATIENT
Start: 2018-02-19 | End: 2018-02-19

## 2018-02-19 RX ORDER — NIFEDIPINE 30 MG
30 TABLET, EXTENDED RELEASE 24 HR ORAL ONCE
Qty: 0 | Refills: 0 | Status: COMPLETED | OUTPATIENT
Start: 2018-02-19 | End: 2018-02-19

## 2018-02-19 RX ORDER — NIFEDIPINE 30 MG
10 TABLET, EXTENDED RELEASE 24 HR ORAL ONCE
Qty: 0 | Refills: 0 | Status: DISCONTINUED | OUTPATIENT
Start: 2018-02-19 | End: 2018-02-19

## 2018-02-19 RX ORDER — NIFEDIPINE 30 MG
20 TABLET, EXTENDED RELEASE 24 HR ORAL ONCE
Qty: 0 | Refills: 0 | Status: DISCONTINUED | OUTPATIENT
Start: 2018-02-19 | End: 2018-02-19

## 2018-02-19 RX ORDER — LABETALOL HCL 100 MG
100 TABLET ORAL THREE TIMES A DAY
Qty: 0 | Refills: 0 | Status: DISCONTINUED | OUTPATIENT
Start: 2018-02-19 | End: 2018-02-19

## 2018-02-19 RX ORDER — NIFEDIPINE 30 MG
30 TABLET, EXTENDED RELEASE 24 HR ORAL ONCE
Qty: 0 | Refills: 0 | Status: DISCONTINUED | OUTPATIENT
Start: 2018-02-19 | End: 2018-02-19

## 2018-02-19 RX ORDER — FUROSEMIDE 40 MG
20 TABLET ORAL ONCE
Qty: 0 | Refills: 0 | Status: COMPLETED | OUTPATIENT
Start: 2018-02-19 | End: 2018-02-19

## 2018-02-19 RX ORDER — LABETALOL HCL 100 MG
400 TABLET ORAL THREE TIMES A DAY
Qty: 0 | Refills: 0 | Status: DISCONTINUED | OUTPATIENT
Start: 2018-02-19 | End: 2018-02-20

## 2018-02-19 RX ORDER — NIFEDIPINE 30 MG
30 TABLET, EXTENDED RELEASE 24 HR ORAL AT BEDTIME
Qty: 0 | Refills: 0 | Status: DISCONTINUED | OUTPATIENT
Start: 2018-02-20 | End: 2018-02-20

## 2018-02-19 RX ADMIN — Medication 100 MILLIGRAM(S): at 15:23

## 2018-02-19 RX ADMIN — Medication 90 MILLIGRAM(S): at 06:44

## 2018-02-19 RX ADMIN — ENOXAPARIN SODIUM 40 MILLIGRAM(S): 100 INJECTION SUBCUTANEOUS at 11:51

## 2018-02-19 RX ADMIN — Medication 400 MILLIGRAM(S): at 22:18

## 2018-02-19 RX ADMIN — Medication 30 MILLIGRAM(S): at 22:17

## 2018-02-19 RX ADMIN — Medication 300 MILLIGRAM(S): at 06:44

## 2018-02-19 RX ADMIN — OXYCODONE AND ACETAMINOPHEN 2 TABLET(S): 5; 325 TABLET ORAL at 01:06

## 2018-02-19 RX ADMIN — Medication 20 MILLIGRAM(S): at 08:30

## 2018-02-19 RX ADMIN — Medication 300 MILLIGRAM(S): at 15:24

## 2018-02-19 RX ADMIN — OXYCODONE AND ACETAMINOPHEN 2 TABLET(S): 5; 325 TABLET ORAL at 00:36

## 2018-02-19 NOTE — PROGRESS NOTE ADULT - SUBJECTIVE AND OBJECTIVE BOX
PGY 3 Post Partum note    Subjective: pt seen and examined at bedside, no acute overnight events but continues to have elevated BPs. Has small headache and continued pitting edema in lower extremities bilaterally. NO change from yesterday. Otherwise denies blurry vision, nausea, vomiting, diarrhea, constipation, RUQ/epigastric and abdominal pain. Urinating without difficulty. Continues to bottlefeed. AM BP was 200/83. Lasix 20 mg PO to be administered with morning BP meds procardia and labetalol    Physical exam:    Vital Signs Last 24 Hrs  T(C): 36.5 (18 Feb 2018 23:39), Max: 36.5 (18 Feb 2018 23:39)  T(F): 97.7 (18 Feb 2018 23:39), Max: 97.7 (18 Feb 2018 23:39)  HR: 85 (19 Feb 2018 06:30) (77 - 110)  BP: 200/83 (19 Feb 2018 06:30) (159/70 - 200/83)  BP(mean): 84 (19 Feb 2018 06:30) (84 - 84)  RR: 18 (18 Feb 2018 23:39) (18 - 18)    Gen: NAD  CVS: s1s2, rrr  Lungs: ctab, no rhonchi or rales  Abdomen: Soft, nontender, no distension , firm uterine fundus at umbilicus.  Pelvic: Normal lochia noted  Ext: No calf tenderness, 2+ pitting edema    Diet:  Enoxaparin Injectable   40 milliGRAM(s) SubCutaneous (02-18-18 @ 11:08)    ibuprofen  Tablet   600 milliGRAM(s) Oral (02-18-18 @ 12:40)    labetalol   300 milliGRAM(s) Oral (02-18-18 @ 12:40)    labetalol   300 milliGRAM(s) Oral (02-19-18 @ 06:44)   300 milliGRAM(s) Oral (02-18-18 @ 21:50)    NIFEdipine IR   30 milliGRAM(s) Oral (02-18-18 @ 17:46)    NIFEdipine XL   90 milliGRAM(s) Oral (02-19-18 @ 06:44)    oxyCODONE    5 mG/acetaminophen 325 mG   2 Tablet(s) Oral (02-19-18 @ 00:36)    LABS:      02-16-18 @ 09:18      134<L>  |  106  |  13  ----------------------------<  92  4.6   |  20  |  0.9        Ca    6.8<L>      16 Feb 2018 09:18    TPro  4.0<L>  /  Alb  1.4<L>  /  TBili  0.6  /  DBili  x   /  AST  46<H>  /  ALT  39  /  AlkPhos  194<H>  02-16-18 @ 09:18

## 2018-02-19 NOTE — CONSULT NOTE ADULT - ASSESSMENT
42yo  at 37w4d GA, twin gestation, by IVF, day 3 frozen embryo transfer presents for admission for repeat  section for preeclampsia with severe range BPs    1) Pre-eclampsia with severe features   2) Uncontrolled HTN     C/w Nifedipine XL 90 mg QD.   Add Labetalol 100 mg three times  a day.   C/w checking BP Q2H   May increase labetalol PRN.     If eventually BP better, may be ok for D/c with outpatient follow up within 3-7 days to try and wean the BP meds.       3) B/l LE edema :   Legs do feel a little warm. May be the left is slightly bigger than right.   Although she has been on prophlyactic anticoagulants all along but may check a LE Duplex if not checked already this admission.     Recall PRN
43 yo lady sp c section and delivery of twin babies at 37 w gestation, now with HTN and proteinuria    - Rule out preeclampsia   -Check spot urine protein creat ratio  - continue Labetalol and increase to 400 mg q8h and continue nifedipine XL and increase to 120 mg xl q24h ( can give extra 30 mg tonight) and give nifedipine  at night dosage  - If patient is not breastfeeding can add ACE inh or ARB if BP is still uncontrolled  (cab decide on that as OP)  - Repeat CMP and CBC in the morning     Thank you for the consult  D/W with Ob team

## 2018-02-19 NOTE — CONSULT NOTE ADULT - SUBJECTIVE AND OBJECTIVE BOX
NEPHROLOGY CONSULTATION NOTE    Patient is a 42y Female whom presented to the hospital with     PAST MEDICAL & SURGICAL HISTORY:  No pertinent past medical history  S/P ovarian cystectomy: laparoscopic  Delivered by  section    Allergies:  No Known Allergies    Home Medications Reviewed  Hospital Medications:   MEDICATIONS  (STANDING):  enoxaparin Injectable 40 milliGRAM(s) SubCutaneous daily  ketorolac   Injectable 30 milliGRAM(s) IV Push once  labetalol 300 milliGRAM(s) Oral three times a day  morphine PF Epidural 3 milliGRAM(s) Epidural once  NIFEdipine XL 90 milliGRAM(s) Oral daily  oxytocin Infusion 41.667 milliUNIT(s)/Min (125 mL/Hr) IV Continuous <Continuous>      SOCIAL HISTORY:  Denies ETOH,Smoking,   FAMILY HISTORY:  Family history of diabetes mellitus (Mother)  Family history of hypertension (Mother)        REVIEW OF SYSTEMS:  CONSTITUTIONAL: No weakness, fevers or chills  EYES/ENT: No visual changes;  No vertigo or throat pain   NECK: No pain or stiffness  RESPIRATORY: No cough, wheezing, hemoptysis; No shortness of breath  CARDIOVASCULAR: No chest pain or palpitations.  GASTROINTESTINAL: No abdominal or epigastric pain. No nausea, vomiting, or hematemesis; No diarrhea or constipation. No melena or hematochezia.  GENITOURINARY: No dysuria, frequency, foamy urine, urinary urgency, incontinence or hematuria  NEUROLOGICAL: No numbness or weakness  SKIN: No itching, burning, rashes, or lesions   VASCULAR: No bilateral lower extremity edema.   All other review of systems is negative unless indicated above.    VITALS:  T(F): 97.1 (18 @ 07:40), Max: 97.7 (18 @ 23:39)  HR: 80 (18 @ 11:54)  BP: 160/74 (18 @ 11:54)  RR: 20 (18 @ 07:40)          I&O's Detail        PHYSICAL EXAM:  Constitutional: NAD  HEENT: anicteric sclera, oropharynx clear, MMM  Neck: No JVD  Respiratory: CTAB, no wheezes, rales or rhonchi  Cardiovascular: S1, S2, RRR  Gastrointestinal: BS+, soft, NT/ND  Extremities: No cyanosis or clubbing. No peripheral edema  Neurological: A/O x 3, no focal deficits  Psychiatric: Normal mood, normal affect  : No CVA tenderness. No guillermo.   Skin: No rashes  Vascular Access:    LABS:      140  |  107  |  6<L>  ----------------------------<  88  4.6   |  26  |  0.5<L>    Ca    8.3<L>      2018 08:32    TPro  4.7<L>  /  Alb  1.9<L>  /  TBili  0.7  /  DBili      /  AST  18  /  ALT  20  /  AlkPhos  169<H>      Creatinine Trend: 0.5 <--, 0.9 <--, 0.9 <--, 0.8 <--                        9.3    5.71  )-----------( 393      ( 2018 08:32 )             28.6     Urine Studies:  Urinalysis Basic - ( 2018 13:15 )    Color: Other / Appearance: Cloudy / SG: >=1.030 / pH:   Gluc:  / Ketone: Negative  / Bili: Small / Urobili: 0.2   Blood:  / Protein: >=300 / Nitrite: Negative   Leuk Esterase: Negative / RBC: 2-5 /HPF / WBC 3-5 /HPF   Sq Epi:  / Non Sq Epi: Many /HPF / Bacteria: Many /HPF                        RADIOLOGY & ADDITIONAL STUDIES: NEPHROLOGY CONSULTATION NOTE    Patient is a 42y Female whom presented to the hospital at 37 w of gestation with HTN sp c section and delivery of twin babies. Patient had high blood pressure before C section and after it, denied chest pain no SOB no cough no dysuria no hematuria, has edema that started during pregnancy, denied headache, no seizure activity no other complaints     PAST MEDICAL & SURGICAL HISTORY:  No pertinent past medical history    S/P ovarian cystectomy: laparoscopic  Delivered by  section    Allergies:  No Known Allergies    Home Medications Reviewed  Hospital Medications:   MEDICATIONS  (STANDING):  enoxaparin Injectable 40 milliGRAM(s) SubCutaneous daily  ketorolac   Injectable 30 milliGRAM(s) IV Push once  labetalol 300 milliGRAM(s) Oral three times a day  morphine PF Epidural 3 milliGRAM(s) Epidural once  NIFEdipine XL 90 milliGRAM(s) Oral daily        SOCIAL HISTORY:  Denies ETOH,Smoking,   FAMILY HISTORY:  Family history of diabetes mellitus (Mother)  Family history of hypertension (Mother)        REVIEW OF SYSTEMS:  CONSTITUTIONAL: No weakness, fevers or chills  EYES/ENT: No visual changes;  No vertigo or throat pain   NECK: No pain or stiffness  RESPIRATORY: No cough, wheezing, hemoptysis; No shortness of breath  CARDIOVASCULAR: No chest pain or palpitations.  GASTROINTESTINAL: No abdominal or epigastric pain. No nausea, vomiting, or hematemesis; No diarrhea or constipation. No melena or hematochezia.  GENITOURINARY: No dysuria, frequency, foamy urine, urinary urgency, incontinence or hematuria  NEUROLOGICAL: No numbness or weakness  SKIN: No itching, burning, rashes, or lesions   VASCULAR: bilateral lower extremity edema plus one    All other review of systems is negative unless indicated above.    VITALS:  T(F): 97.1 (18 @ 07:40), Max: 97.7 (18 @ 23:39)  HR: 80 (18 @ 11:54)  BP: 160/74 (18 @ 11:54)  RR: 20 (18 @ 07:40)          I&O's Detail        PHYSICAL EXAM:  Constitutional: NAD  HEENT: anicteric sclera, oropharynx clear, MMM  Neck: No JVD  Respiratory: CTAB, no wheezes, rales or rhonchi  Cardiovascular: S1, S2, RRR  Gastrointestinal: BS+, soft, NT/ND  Extremities: No cyanosis or clubbing. No peripheral edema        LABS:      140  |  107  |  6<L>  ----------------------------<  88  4.6   |  26  |  0.5<L>    Ca    8.3<L>      2018 08:32    TPro  4.7<L>  /  Alb  1.9<L>  /  TBili  0.7  /  DBili      /  AST  18  /  ALT  20  /  AlkPhos  169<H>      Creatinine Trend: 0.5 <--, 0.9 <--, 0.9 <--, 0.8 <--                        9.3    5.71  )-----------( 393      ( 2018 08:32 )             28.6     Urine Studies:  Urinalysis Basic - ( 2018 13:15 )    Color: Other / Appearance: Cloudy / SG: >=1.030 / pH:   Gluc:  / Ketone: Negative  / Bili: Small / Urobili: 0.2   Blood:  / Protein: >=300 / Nitrite: Negative   Leuk Esterase: Negative / RBC: 2-5 /HPF / WBC 3-5 /HPF   Sq Epi:  / Non Sq Epi: Many /HPF / Bacteria: Many /HPF

## 2018-02-19 NOTE — PROGRESS NOTE ADULT - ASSESSMENT
42yo , GDMA1, s/p  delivery for twins with preeclampsia with severe features, s/p Magnesium, with consistently severely elevated blood pressures despite increasing doses of antihypertensives. Now with elevated BP. Asymptomati  -s/p Med consult: c/w procardia 90XL, labetalol 300mg BID  - will give 20 mg Lasix   -BPs q2 hr  - pain management PRN  - will re-consider medicine consult

## 2018-02-20 ENCOUNTER — TRANSCRIPTION ENCOUNTER (OUTPATIENT)
Age: 43
End: 2018-02-20

## 2018-02-20 VITALS
SYSTOLIC BLOOD PRESSURE: 186 MMHG | TEMPERATURE: 97 F | RESPIRATION RATE: 20 BRPM | HEART RATE: 80 BPM | DIASTOLIC BLOOD PRESSURE: 79 MMHG

## 2018-02-20 LAB
ALBUMIN SERPL ELPH-MCNC: 2.1 G/DL — LOW (ref 3–5.5)
ALBUMIN SERPL ELPH-MCNC: 2.3 G/DL — LOW (ref 3–5.5)
ALP SERPL-CCNC: 168 U/L — HIGH (ref 30–115)
ALP SERPL-CCNC: 192 U/L — HIGH (ref 30–115)
ALT FLD-CCNC: 21 U/L — SIGNIFICANT CHANGE UP (ref 0–41)
ALT FLD-CCNC: 22 U/L — SIGNIFICANT CHANGE UP (ref 0–41)
ANION GAP SERPL CALC-SCNC: 8 MMOL/L — SIGNIFICANT CHANGE UP (ref 7–14)
ANION GAP SERPL CALC-SCNC: 8 MMOL/L — SIGNIFICANT CHANGE UP (ref 7–14)
AST SERPL-CCNC: 23 U/L — SIGNIFICANT CHANGE UP (ref 0–41)
AST SERPL-CCNC: 26 U/L — SIGNIFICANT CHANGE UP (ref 0–41)
BASOPHILS # BLD AUTO: 0.07 K/UL — SIGNIFICANT CHANGE UP (ref 0–0.2)
BASOPHILS NFR BLD AUTO: 1.2 % — HIGH (ref 0–1)
BILIRUB SERPL-MCNC: 0.4 MG/DL — SIGNIFICANT CHANGE UP (ref 0.2–1.2)
BILIRUB SERPL-MCNC: 0.5 MG/DL — SIGNIFICANT CHANGE UP (ref 0.2–1.2)
BUN SERPL-MCNC: 7 MG/DL — LOW (ref 10–20)
BUN SERPL-MCNC: 8 MG/DL — LOW (ref 10–20)
CALCIUM SERPL-MCNC: 8.5 MG/DL — SIGNIFICANT CHANGE UP (ref 8.5–10.1)
CALCIUM SERPL-MCNC: 8.6 MG/DL — SIGNIFICANT CHANGE UP (ref 8.5–10.1)
CHLORIDE SERPL-SCNC: 105 MMOL/L — SIGNIFICANT CHANGE UP (ref 98–110)
CHLORIDE SERPL-SCNC: 107 MMOL/L — SIGNIFICANT CHANGE UP (ref 98–110)
CO2 SERPL-SCNC: 23 MMOL/L — SIGNIFICANT CHANGE UP (ref 17–32)
CO2 SERPL-SCNC: 24 MMOL/L — SIGNIFICANT CHANGE UP (ref 17–32)
CREAT SERPL-MCNC: 0.4 MG/DL — LOW (ref 0.7–1.5)
CREAT SERPL-MCNC: 0.6 MG/DL — LOW (ref 0.7–1.5)
EOSINOPHIL # BLD AUTO: 0.21 K/UL — SIGNIFICANT CHANGE UP (ref 0–0.7)
EOSINOPHIL NFR BLD AUTO: 3.7 % — SIGNIFICANT CHANGE UP (ref 0–8)
GLUCOSE SERPL-MCNC: 144 MG/DL — HIGH (ref 70–110)
GLUCOSE SERPL-MCNC: 93 MG/DL — SIGNIFICANT CHANGE UP (ref 70–110)
HCT VFR BLD CALC: 28.2 % — LOW (ref 37–47)
HCT VFR BLD CALC: 30.2 % — LOW (ref 37–47)
HGB BLD-MCNC: 9.2 G/DL — LOW (ref 14–18)
HGB BLD-MCNC: 9.9 G/DL — LOW (ref 14–18)
IMM GRANULOCYTES NFR BLD AUTO: 0.9 % — HIGH (ref 0.1–0.3)
LYMPHOCYTES # BLD AUTO: 1.61 K/UL — SIGNIFICANT CHANGE UP (ref 1.2–3.4)
LYMPHOCYTES # BLD AUTO: 28.1 % — SIGNIFICANT CHANGE UP (ref 20.5–51.1)
MCHC RBC-ENTMCNC: 28.5 PG — SIGNIFICANT CHANGE UP (ref 27–31)
MCHC RBC-ENTMCNC: 28.9 PG — SIGNIFICANT CHANGE UP (ref 27–31)
MCHC RBC-ENTMCNC: 32.6 G/DL — SIGNIFICANT CHANGE UP (ref 32–37)
MCHC RBC-ENTMCNC: 32.8 G/DL — SIGNIFICANT CHANGE UP (ref 32–37)
MCV RBC AUTO: 87.3 FL — SIGNIFICANT CHANGE UP (ref 81–91)
MCV RBC AUTO: 88.3 FL — SIGNIFICANT CHANGE UP (ref 81–91)
MONOCYTES # BLD AUTO: 0.35 K/UL — SIGNIFICANT CHANGE UP (ref 0.1–0.6)
MONOCYTES NFR BLD AUTO: 6.1 % — SIGNIFICANT CHANGE UP (ref 1.7–9.3)
NEUTROPHILS # BLD AUTO: 3.43 K/UL — SIGNIFICANT CHANGE UP (ref 1.4–6.5)
NEUTROPHILS NFR BLD AUTO: 60 % — SIGNIFICANT CHANGE UP (ref 42.2–75.2)
NRBC # BLD: 0 /100 WBCS — SIGNIFICANT CHANGE UP (ref 0–0)
NRBC # BLD: 0 /100 WBCS — SIGNIFICANT CHANGE UP (ref 0–0)
PLATELET # BLD AUTO: 403 K/UL — HIGH (ref 130–400)
PLATELET # BLD AUTO: 439 K/UL — HIGH (ref 130–400)
POTASSIUM SERPL-MCNC: 4.7 MMOL/L — SIGNIFICANT CHANGE UP (ref 3.5–5)
POTASSIUM SERPL-MCNC: 4.7 MMOL/L — SIGNIFICANT CHANGE UP (ref 3.5–5)
POTASSIUM SERPL-SCNC: 4.7 MMOL/L — SIGNIFICANT CHANGE UP (ref 3.5–5)
POTASSIUM SERPL-SCNC: 4.7 MMOL/L — SIGNIFICANT CHANGE UP (ref 3.5–5)
PROT SERPL-MCNC: 4.8 G/DL — LOW (ref 6–8)
PROT SERPL-MCNC: 5.4 G/DL — LOW (ref 6–8)
RBC # BLD: 3.23 M/UL — LOW (ref 4.2–5.4)
RBC # BLD: 3.42 M/UL — LOW (ref 4.2–5.4)
RBC # FLD: 17.6 % — HIGH (ref 11.5–14.5)
RBC # FLD: 17.9 % — HIGH (ref 11.5–14.5)
SODIUM SERPL-SCNC: 137 MMOL/L — SIGNIFICANT CHANGE UP (ref 135–146)
SODIUM SERPL-SCNC: 138 MMOL/L — SIGNIFICANT CHANGE UP (ref 135–146)
WBC # BLD: 5.67 K/UL — SIGNIFICANT CHANGE UP (ref 4.8–10.8)
WBC # BLD: 5.72 K/UL — SIGNIFICANT CHANGE UP (ref 4.8–10.8)
WBC # FLD AUTO: 5.67 K/UL — SIGNIFICANT CHANGE UP (ref 4.8–10.8)
WBC # FLD AUTO: 5.72 K/UL — SIGNIFICANT CHANGE UP (ref 4.8–10.8)

## 2018-02-20 RX ORDER — NIFEDIPINE 30 MG
120 TABLET, EXTENDED RELEASE 24 HR ORAL ONCE
Qty: 0 | Refills: 0 | Status: COMPLETED | OUTPATIENT
Start: 2018-02-20 | End: 2018-02-20

## 2018-02-20 RX ORDER — NIFEDIPINE 30 MG
30 TABLET, EXTENDED RELEASE 24 HR ORAL AT BEDTIME
Qty: 0 | Refills: 0 | Status: DISCONTINUED | OUTPATIENT
Start: 2018-02-20 | End: 2018-02-20

## 2018-02-20 RX ORDER — NIFEDIPINE 30 MG
90 TABLET, EXTENDED RELEASE 24 HR ORAL DAILY
Qty: 0 | Refills: 0 | Status: DISCONTINUED | OUTPATIENT
Start: 2018-02-20 | End: 2018-02-20

## 2018-02-20 RX ORDER — CHLORTHALIDONE 50 MG
12.5 TABLET ORAL ONCE
Qty: 0 | Refills: 0 | Status: COMPLETED | OUTPATIENT
Start: 2018-02-20 | End: 2018-02-20

## 2018-02-20 RX ORDER — IBUPROFEN 200 MG
1 TABLET ORAL
Qty: 0 | Refills: 0 | COMMUNITY
Start: 2018-02-20

## 2018-02-20 RX ORDER — CHLORTHALIDONE 50 MG
12.5 TABLET ORAL
Qty: 0 | Refills: 0 | COMMUNITY
Start: 2018-02-20

## 2018-02-20 RX ORDER — CHLORTHALIDONE 50 MG
25 TABLET ORAL DAILY
Qty: 0 | Refills: 0 | Status: DISCONTINUED | OUTPATIENT
Start: 2018-02-20 | End: 2018-02-20

## 2018-02-20 RX ORDER — NIFEDIPINE 30 MG
120 TABLET, EXTENDED RELEASE 24 HR ORAL
Qty: 0 | Refills: 0 | COMMUNITY

## 2018-02-20 RX ORDER — LABETALOL HCL 100 MG
2 TABLET ORAL
Qty: 0 | Refills: 0 | COMMUNITY
Start: 2018-02-20

## 2018-02-20 RX ORDER — CHLORTHALIDONE 50 MG
12.5 TABLET ORAL DAILY
Qty: 0 | Refills: 0 | Status: DISCONTINUED | OUTPATIENT
Start: 2018-02-20 | End: 2018-02-20

## 2018-02-20 RX ADMIN — Medication 400 MILLIGRAM(S): at 06:11

## 2018-02-20 RX ADMIN — OXYCODONE AND ACETAMINOPHEN 2 TABLET(S): 5; 325 TABLET ORAL at 01:03

## 2018-02-20 RX ADMIN — Medication 400 MILLIGRAM(S): at 14:09

## 2018-02-20 RX ADMIN — OXYCODONE AND ACETAMINOPHEN 2 TABLET(S): 5; 325 TABLET ORAL at 00:33

## 2018-02-20 RX ADMIN — Medication 12.5 MILLIGRAM(S): at 16:27

## 2018-02-20 RX ADMIN — Medication 120 MILLIGRAM(S): at 08:54

## 2018-02-20 RX ADMIN — ENOXAPARIN SODIUM 40 MILLIGRAM(S): 100 INJECTION SUBCUTANEOUS at 11:40

## 2018-02-20 NOTE — PROGRESS NOTE ADULT - ASSESSMENT
41 yo lady sp c section and delivery of twin babies at 37 w gestation, now with HTN and proteinuria    - Patient has preeclampsia   -Proteinuria of 5 g   - continue Labetalol 400 mg q8h and Procardia xl 120 at night and add Chlorthalidone 12,5 mg q24 (first dose now) , target SBP<160- Patient is not breastfeeding   - Repeat CMP and CBC in the morning     will follow with you

## 2018-02-20 NOTE — PROVIDER CONTACT NOTE (OTHER) - ACTION/TREATMENT ORDERED:
pt c/o pain.pt took pain med.will continue to monitor.
 @ bedside and BP taken by Manual 160/70.will continue to monitor.
BP med given as ordered.

## 2018-02-20 NOTE — DISCHARGE NOTE OB - HOSPITAL COURSE
DATE OF DISCHARGE: 18 @ 09:13    HISTORY OF PRESENT ILLNESS/HOSPITAL COURSE: HPI:  44yo  at 37w4d GA, twin gestation, by IVF, day 3 frozen embryo transfer, sent in from office for elevated /90 with protein on Udip. Denies h/o elevated BPs previously during the pregnancy. Denies ctx, VB/LOF. Good fetal movements. Denies headache, blurred vision, RUQ/epigastric pain. C/o B/L pitting edema for the past 3 weeks. GDMA1 during this pregnancy, FFS range: 68-80, PPFS: 100-105, well controlled. Was on lovenox during this pregnancy in view of IVF gestation, switched to heparin at 36 w GA, last dose last night at 0000. In same sex relationship. Official sonogram done earlier today, efw for both babies 5-9 to 6lb. Last exam 1 week ago- closed.    OBHx: C/S X1, NRFHR 5-9; SABX1 at 6 w GA, no D&C done     GynHx: h/o endometriosis, S/P lap cystectomy; h/o abnormal pap smear, required no further follow up as per patient. Denies h/o fibroids, STIs. (2018 16:07)    PAST MEDICAL & SURGICAL HISTORY:  No pertinent past medical history  S/P ovarian cystectomy: laparoscopic  Delivered by  section      PROCEDURES PERFORMED:  section    COMPLICATIONS:  post operative hypertension that is difficult to control    POST operative COURSE:   patient had elevated BPs was seen by medicine and nephrology, who recommended procardia 120mg XL and labetalol 400TID. Patient had LE swelling, duplex was negative.     FINAL DIAGNOSIS:      LABS:                       9.3    5.71  )-----------( 393      ( 2018 08:32 )             28.6       DISCHARGE INSTRUCTIONS:      If you have severe abdominal pain, heavy vaginal bleeding, shortness of breath or chest pain please call your doctor or come to the emergency room.   DIET:  Advance as tolerated.  ACTIVITY:  Advance as tolerated.  Pelvic rest for 6 weeks.  Nothing to be inserted into the vagina for 6 weeks, i.e. no tampons, douching, sexual relations, or tub baths.   FOLLOW UP: Make an appointment to see your doctor as instructed   PRESCRIPTIONS: Prescriptions :procardia 120mg XL, labetalol 400TID

## 2018-02-20 NOTE — PROGRESS NOTE ADULT - SUBJECTIVE AND OBJECTIVE BOX
Nephrology progress note    Patient is seen and examined, events over the last 24 h noted     Allergies:  No Known Allergies    Hospital Medications:   MEDICATIONS  (STANDING):  enoxaparin Injectable 40 milliGRAM(s) SubCutaneous daily  ketorolac   Injectable 30 milliGRAM(s) IV Push once  labetalol 400 milliGRAM(s) Oral three times a day  morphine PF Epidural 3 milliGRAM(s) Epidural once  oxytocin Infusion 41.667 milliUNIT(s)/Min (125 mL/Hr) IV Continuous <Continuous>        VITALS:  T(F): 96.7 (02-20-18 @ 09:44), Max: 98.2 (02-19-18 @ 15:45)  HR: 78 (02-20-18 @ 13:07)  BP: 189/76 (02-20-18 @ 13:07)  RR: 19 (02-20-18 @ 09:44)      PHYSICAL EXAM:  Constitutional: NAD  HEENT: anicteric sclera, oropharynx clear, MMM  Neck: No JVD  Respiratory: CTAB, no wheezes, rales or rhonchi  Cardiovascular: S1, S2, RRR  Gastrointestinal: BS+, soft, NT/ND  Extremities: No cyanosis or clubbing. peripheral edema plus 2   :  No guillermo.   Skin: No rashes    LABS:  02-20    137  |  105  |  7<L>  ----------------------------<  144<H>  4.7   |  24  |  0.6<L>    Ca    8.6      20 Feb 2018 09:47    TPro  5.4<L>  /  Alb  2.3<L>  /  TBili  0.5  /  DBili      /  AST  26  /  ALT  22  /  AlkPhos  192<H>  02-20                          9.2    5.67  )-----------( 403      ( 20 Feb 2018 12:14 )             28.2       Urine Studies:  Urinalysis Basic - ( 14 Feb 2018 13:15 )    Color: Other / Appearance: Cloudy / SG: >=1.030 / pH:   Gluc:  / Ketone: Negative  / Bili: Small / Urobili: 0.2   Blood:  / Protein: >=300 / Nitrite: Negative   Leuk Esterase: Negative / RBC: 2-5 /HPF / WBC 3-5 /HPF   Sq Epi:  / Non Sq Epi: Many /HPF / Bacteria: Many /HPF    Creatinine, Random Urine: 83 mg/dL (02-19 @ 15:10)  Protein/Creatinine Ratio Calculation: 5.0 Ratio (02-19 @ 15:10)

## 2018-02-20 NOTE — DISCHARGE NOTE OB - CARE PROVIDER_API CALL
Zay Schultz), Obstetrics and Gynecology  [address] [place] [address]  Phone: [phone]  Fax: [phone]

## 2018-02-20 NOTE — DISCHARGE NOTE OB - CARE PLAN
Principal Discharge DX:	Pre-eclampsia in third trimester  Goal:	healthy patient  Assessment and plan of treatment:	 section for severe preeclampsia twins gestation  -continue taking blood pressure medications  -no douching, no sex, no tampons, no hot tubs for 6 weeks  -if severe abdominal pain or fever >100.5 please call your doctor

## 2018-02-20 NOTE — PROGRESS NOTE ADULT - ATTENDING COMMENTS
Patient seen and evaluated. Doing well.  Agree with the resident history, physical, and plan which I reviewed and edited where appropriate.  Pain well controlled with Motrin.  Continue Procardia 30XL daily.  If BP well controlled consider discharge tomorrow.
Patient seen and evaluated.  Agree with above history, physical and plan, which I have reviewed and edited where needed.  Lower extremity Dopplers  BP monitoring today, if remain stable possible discharge tomorrow
Patient seen and examined. Agree with above history, assessment and plan.  BPs still in severe range, now on Procardia 120mg and Labetalol 400mg TID s/p medicine and nephrology consults.  Patient frustrated with current situation and would like to be discharged home. Discussed risks of discharge to include but not limited to stroke, MI, seizures and hypotension. Expressed understanding. Will try to get appointment with her PCP for tomorrow or Thursday.  If systolic BPs remain 160s or lower and has appointment with PCP consider discharge this afternoon.  If systolic BPs >170 add Lisinopril.
Patient seen and examined. Agree with the above history, physical and plan, which I have reviewed and edited where needed.  Continue BP monitoring  Possible discharge tomorrow if BP control achieved

## 2018-02-20 NOTE — PROGRESS NOTE ADULT - SUBJECTIVE AND OBJECTIVE BOX
GYN Progress Note  She is a  42y woman who is now post-operative day 6    Subjective:    Patient reports feeling anxious and upset about staying in the hospital for so long and desires to go home. Her pain is minimal. Patient tolerating regular diet. Patient ambulating without difficulty. Patient voiding and passing flatus and had BM.  Patient denies nausea/vomiting, no fever/chills, no shortness of breath, no chest pain, no palpitations, no urinary symptoms, no bowel movement yet, no HA, visual disturbances, no RUQ pain.    Physical exam:  Vital Signs Last 24 Hrs  T(C): 36.4 (2018 00:34), Max: 36.8 (2018 15:45)  T(F): 97.6 (2018 00:34), Max: 98.2 (2018 15:45)  HR: 80 (2018 02:17) (76 - 85)  BP: 163/61 (2018 06:08) (159/70 - 200/83)  BP(mean): 84 (2018 06:30) (84 - 84)  RR: 18 (2018 00:34) (18 - 20)  BPrange: 160-181/70-81       Gen: NAD, AAOx3  CVS: S1S2 wnl, RRR  Lungs: CTAB  Abdomen: obese, Soft, nontender, no distension , firm uterine fundus below umbilicus; BS x4; no rebound/guarding/rigidity  CJ dressing is in place  Pelvic: Normal lochia noted  Ext: No calf tenderness, edema R>L 2+ up to the shins    Medications:   furosemide    Tablet 20 milliGRAM(s) Oral once  labetalol 400 milliGRAM(s) Oral three times a day  NIFEdipine XL 90 milliGRAM(s) Oral daily      Labs:                        9.3    5.71  )-----------( 393      ( 2018 08:32 )             28.6       18 @ 08:32      140  |  107  |  6<L>  ----------------------------<  88  4.6   |  26  |  0.5<L>        Ca    8.3<L>      2018 08:32    TPro  4.7<L>  /  Alb  1.9<L>  /  TBili  0.7  /  DBili  x   /  AST  18  /  ALT  20  /  AlkPhos  169<H>  18 @ 08:32        Allergies    No Known Allergies    Assessment and Plan:  POD # 6  42yo , GDMA1, s/p  delivery for twins with preeclampsia with severe features, s/p Magnesium, with consistently severely elevated blood pressures despite increasing doses of antihypertensives.   -s/p Med consult; sp Nephro consult, recommended to start procardia 120xl and continue with labetalol 400mg with Goal 140/150's; consider adding ACEI/ARB  -will speak with PMD to make this change  -change CJ upon discharge  -F/up AM labs  - pain management PRN  will speak with PMD GYN Progress Note  She is a  42y woman who is now post-operative day 6    Subjective:    Patient reports feeling anxious and upset about staying in the hospital for so long and desires to go home. Her pain is minimal. Patient tolerating regular diet. Patient ambulating without difficulty. Patient voiding and passing flatus and had BM.  Patient denies nausea/vomiting, no fever/chills, no shortness of breath, no chest pain, no palpitations, no urinary symptoms, no bowel movement yet, no HA, visual disturbances, no RUQ pain.    Physical exam:  Vital Signs Last 24 Hrs  T(C): 36.4 (2018 00:34), Max: 36.8 (2018 15:45)  T(F): 97.6 (2018 00:34), Max: 98.2 (2018 15:45)  HR: 80 (2018 02:17) (76 - 85)  BP: 163/61 (2018 06:08) (159/70 - 200/83)  BP(mean): 84 (2018 06:30) (84 - 84)  RR: 18 (2018 00:34) (18 - 20)  BPrange: 160-181/70-81       Gen: NAD, AAOx3  CVS: S1S2 wnl, RRR  Lungs: CTAB  Abdomen: obese, Soft, nontender, no distension , firm uterine fundus below umbilicus; BS x4; no rebound/guarding/rigidity  CJ dressing is in place  Pelvic: Normal lochia noted  Ext: No calf tenderness, edema R>L 2+ up to the shins    Medications:   furosemide    Tablet 20 milliGRAM(s) Oral once  labetalol 400 milliGRAM(s) Oral three times a day  NIFEdipine XL 90 milliGRAM(s) Oral daily      Labs:                        9.3    5.71  )-----------( 393      ( 2018 08:32 )             28.6       18 @ 08:32      140  |  107  |  6<L>  ----------------------------<  88  4.6   |  26  |  0.5<L>        Ca    8.3<L>      2018 08:32    TPro  4.7<L>  /  Alb  1.9<L>  /  TBili  0.7  /  DBili  x   /  AST  18  /  ALT  20  /  AlkPhos  169<H>  18 @ 08:32        Allergies    No Known Allergies    Assessment and Plan:  POD # 6  42yo , GDMA1, s/p  delivery for twins with preeclampsia with severe features, s/p Magnesium, with consistently severely elevated blood pressures despite increasing doses of antihypertensives.   -s/p Med consult; s/p Nephro consult, recommended to start procardia 120xl and continue with labetalol 400mg with Goal 140/150's; consider adding ACEI/ARB  -will speak with PMD to make this change  -change CJ upon discharge  -F/up AM labs  - pain management PRN  will speak with PMD

## 2018-02-20 NOTE — DISCHARGE NOTE OB - MEDICATION SUMMARY - MEDICATIONS TO CHANGE
I will SWITCH the dose or number of times a day I take the medications listed below when I get home from the hospital:  None I will SWITCH the dose or number of times a day I take the medications listed below when I get home from the hospital:    ibuprofen 600 mg oral tablet  -- 1 tab(s) by mouth every 6 hours, As needed, Mild pain or headache    labetalol 200 mg oral tablet  -- 2 tab(s) by mouth 3 times a day    Procardia XL  -- 120 milligram(s) by mouth once a day

## 2018-02-20 NOTE — DISCHARGE NOTE OB - PATIENT PORTAL LINK FT
You can access the [facility], offered by [facility], by registering with the following website: [email or web address]

## 2018-02-20 NOTE — DISCHARGE NOTE OB - PLAN OF CARE
healthy patient  section for severe preeclampsia twins gestation  -continue taking blood pressure medications  -no douching, no sex, no tampons, no hot tubs for 6 weeks  -if severe abdominal pain or fever >100.5 please call your doctor

## 2018-02-20 NOTE — DISCHARGE NOTE OB - MEDICATION SUMMARY - MEDICATIONS TO TAKE
I will START or STAY ON the medications listed below when I get home from the hospital:    ibuprofen 600 mg oral tablet  -- 1 tab(s) by mouth every 6 hours, As needed, Mild pain or headache  -- Indication: For C/SECTION    labetalol 200 mg oral tablet  -- 2 tab(s) by mouth 3 times a day  -- Indication: For Pre-eclampsia in third trimester    Procardia XL  -- 120 milligram(s) by mouth once a day  -- Indication: For Pre-eclampsia in third trimester I will START or STAY ON the medications listed below when I get home from the hospital:    ibuprofen 600 mg oral tablet  -- 1 tab(s) by mouth every 6 hours, As needed, Mild pain or headache  -- Indication: For C/SECTION    labetalol 200 mg oral tablet  -- 2 tab(s) by mouth 3 times a day  -- Indication: For Pre-eclampsia in third trimester    Procardia XL  -- 120 milligram(s) by mouth once a day  -- Indication: For Pre-eclampsia in third trimester    chlorthalidone  -- 12.5 milligram(s) by mouth once a day  -- Indication: For Pre-eclampsia in third trimester

## 2018-02-22 DIAGNOSIS — O34.211 MATERNAL CARE FOR LOW TRANSVERSE SCAR FROM PREVIOUS CESAREAN DELIVERY: ICD-10-CM

## 2018-02-22 DIAGNOSIS — Z34.83 ENCOUNTER FOR SUPERVISION OF OTHER NORMAL PREGNANCY, THIRD TRIMESTER: ICD-10-CM

## 2018-02-22 DIAGNOSIS — N80.9 ENDOMETRIOSIS, UNSPECIFIED: ICD-10-CM

## 2018-02-22 DIAGNOSIS — Z3A.37 37 WEEKS GESTATION OF PREGNANCY: ICD-10-CM

## 2018-02-28 ENCOUNTER — APPOINTMENT (OUTPATIENT)
Dept: OBGYN | Facility: CLINIC | Age: 43
End: 2018-02-28
Payer: COMMERCIAL

## 2018-02-28 VITALS — WEIGHT: 260 LBS | BODY MASS INDEX: 46.06 KG/M2

## 2018-02-28 DIAGNOSIS — Z09 ENCOUNTER FOR FOLLOW-UP EXAMINATION AFTER COMPLETED TREATMENT FOR CONDITIONS OTHER THAN MALIGNANT NEOPLASM: ICD-10-CM

## 2018-02-28 PROCEDURE — 99024 POSTOP FOLLOW-UP VISIT: CPT

## 2018-02-28 RX ORDER — ENOXAPARIN SODIUM 100 MG/ML
40 INJECTION SUBCUTANEOUS DAILY
Qty: 21 | Refills: 1 | Status: ACTIVE | COMMUNITY
Start: 2018-02-28 | End: 1900-01-01

## 2018-03-26 ENCOUNTER — APPOINTMENT (OUTPATIENT)
Dept: OBGYN | Facility: CLINIC | Age: 43
End: 2018-03-26
Payer: COMMERCIAL

## 2018-03-26 ENCOUNTER — OUTPATIENT (OUTPATIENT)
Dept: OUTPATIENT SERVICES | Facility: HOSPITAL | Age: 43
LOS: 1 days | Discharge: HOME | End: 2018-03-26

## 2018-03-26 VITALS — WEIGHT: 258 LBS | SYSTOLIC BLOOD PRESSURE: 122 MMHG | DIASTOLIC BLOOD PRESSURE: 72 MMHG | BODY MASS INDEX: 45.7 KG/M2

## 2018-03-26 DIAGNOSIS — Z98.890 OTHER SPECIFIED POSTPROCEDURAL STATES: Chronic | ICD-10-CM

## 2018-03-26 LAB
BILIRUB UR QL STRIP: NORMAL
GLUCOSE UR-MCNC: NORMAL
HCG UR QL: NORMAL EU/DL
HGB UR QL STRIP.AUTO: 50
KETONES UR-MCNC: NORMAL
LEUKOCYTE ESTERASE UR QL STRIP: NORMAL
NITRITE UR QL STRIP: NORMAL
PH UR STRIP: 5
PROT UR STRIP-MCNC: 30
SP GR UR STRIP: 1.01

## 2018-03-26 PROCEDURE — 0503F POSTPARTUM CARE VISIT: CPT

## 2018-04-02 LAB — HPV HIGH+LOW RISK DNA PNL CVX: NOT DETECTED

## 2021-08-25 NOTE — OB RN DELIVERY SUMMARY - NS_ROMTYPE_OBGYN_ALL_OB
Alert-The patient is alert, awake and responds to voice. The patient is oriented to time, place, and person. The triage nurse is able to obtain subjective information. AROM

## 2021-11-29 NOTE — OB RN INTRAOPERATIVE RECORD - NS_DELIVERYRN_OBGYN_ALL_OB_FT
[facility] Counseling:  I discussed with the patient the risks of imiquimod including but not limited to erythema, scaling, itching, weeping, crusting, and pain.  Patient understands that the inflammatory response to imiquimod is variable from person to person and was educated regarded proper titration schedule.  If flu-like symptoms develop, patient knows to discontinue the medication and contact us.

## 2024-06-06 NOTE — OB RN PATIENT PROFILE - PARENTS VERBALIZED UNDERSTANDING OF THE SAFE SKIN TO SKIN POSITIONING OF THE NEWBORN.
"Pt. anxious and mumbling ruminations upon awakening. \" Did I do something wrong. Did I caused damage to myself or anyone else\" Slightly diaphoretic and tachycardiac. Given AM medication with ativan 1 mg.  " Statement Selected

## 2024-11-05 NOTE — DISCHARGE NOTE OB - BREAST MILK PROVIDES PROTECTION AGAINST INFECTION
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  Continue Topamax 50 mg PO BID; slowly titrate for mood control and to reduce weight gain associated with SGA therapy.   Continue Lamictal 25mg PO QD for depression and mood instability; started 10/31/2024  Increase Zyprexa 2.5mg PO QD and continue 10mg PO QHS for ruminations, residual depression, mood instability   Statement Selected

## 2025-03-10 NOTE — OB PROVIDER H&P - NS_FHRDECEL_OBGYN_ALL_OB
No Decelerations No. KYLEE screening performed.  STOP BANG Legend: 0-2 = LOW Risk; 3-4 = INTERMEDIATE Risk; 5-8 = HIGH Risk